# Patient Record
Sex: MALE | Race: WHITE | NOT HISPANIC OR LATINO | Employment: FULL TIME | URBAN - METROPOLITAN AREA
[De-identification: names, ages, dates, MRNs, and addresses within clinical notes are randomized per-mention and may not be internally consistent; named-entity substitution may affect disease eponyms.]

---

## 2023-02-24 PROBLEM — R73.9 ELEVATED BLOOD SUGAR: Status: ACTIVE | Noted: 2023-02-24

## 2023-02-24 PROBLEM — R00.2 PALPITATIONS: Status: ACTIVE | Noted: 2023-02-24

## 2023-02-24 PROBLEM — N50.89 TESTICLE LUMP: Status: ACTIVE | Noted: 2023-02-24

## 2023-02-24 PROBLEM — R06.83 SNORING: Status: ACTIVE | Noted: 2023-02-24

## 2023-02-24 PROBLEM — K21.9 ACID REFLUX: Status: ACTIVE | Noted: 2023-02-24

## 2023-02-24 PROBLEM — H57.89 IRRITATION OF LEFT EYE: Status: ACTIVE | Noted: 2023-02-24

## 2023-02-24 PROBLEM — M54.50 LEFT LOW BACK PAIN: Status: ACTIVE | Noted: 2023-02-24

## 2023-02-24 PROBLEM — E66.9 CLASS 1 OBESITY WITH BODY MASS INDEX (BMI) OF 31.0 TO 31.9 IN ADULT: Status: ACTIVE | Noted: 2023-02-24

## 2023-02-24 PROBLEM — D64.9 ANEMIA: Status: ACTIVE | Noted: 2023-02-24

## 2023-02-24 PROBLEM — E66.811 CLASS 1 OBESITY WITH BODY MASS INDEX (BMI) OF 31.0 TO 31.9 IN ADULT: Status: ACTIVE | Noted: 2023-02-24

## 2023-02-24 PROBLEM — E03.9 HYPOTHYROIDISM: Status: ACTIVE | Noted: 2023-02-24

## 2023-02-24 PROBLEM — N43.3 HYDROCELE: Status: ACTIVE | Noted: 2023-02-24

## 2023-02-24 RX ORDER — LEVOTHYROXINE SODIUM 88 UG/1
88 TABLET ORAL DAILY
COMMUNITY
End: 2023-05-22 | Stop reason: SDUPTHER

## 2023-03-16 ENCOUNTER — APPOINTMENT (OUTPATIENT)
Dept: PRIMARY CARE | Facility: CLINIC | Age: 53
End: 2023-03-16
Payer: COMMERCIAL

## 2023-05-15 ENCOUNTER — TELEPHONE (OUTPATIENT)
Dept: PRIMARY CARE | Facility: CLINIC | Age: 53
End: 2023-05-15
Payer: COMMERCIAL

## 2023-05-15 DIAGNOSIS — Z00.00 ROUTINE GENERAL MEDICAL EXAMINATION AT A HEALTH CARE FACILITY: Primary | ICD-10-CM

## 2023-05-15 DIAGNOSIS — E03.9 HYPOTHYROIDISM, UNSPECIFIED TYPE: ICD-10-CM

## 2023-05-15 DIAGNOSIS — R73.9 ELEVATED BLOOD SUGAR: ICD-10-CM

## 2023-05-20 LAB
THYROTROPIN (MIU/L) IN SER/PLAS BY DETECTION LIMIT <= 0.05 MIU/L: 5.32 MIU/L (ref 0.44–3.98)
THYROXINE (T4) FREE (NG/DL) IN SER/PLAS: 1.13 NG/DL (ref 0.78–1.48)

## 2023-05-22 ENCOUNTER — OFFICE VISIT (OUTPATIENT)
Dept: PRIMARY CARE | Facility: CLINIC | Age: 53
End: 2023-05-22
Payer: COMMERCIAL

## 2023-05-22 VITALS
SYSTOLIC BLOOD PRESSURE: 140 MMHG | BODY MASS INDEX: 31.49 KG/M2 | WEIGHT: 225.8 LBS | DIASTOLIC BLOOD PRESSURE: 90 MMHG | TEMPERATURE: 98.1 F

## 2023-05-22 DIAGNOSIS — E66.9 CLASS 1 OBESITY WITHOUT SERIOUS COMORBIDITY WITH BODY MASS INDEX (BMI) OF 31.0 TO 31.9 IN ADULT, UNSPECIFIED OBESITY TYPE: ICD-10-CM

## 2023-05-22 DIAGNOSIS — R03.0 ELEVATED BLOOD PRESSURE READING: ICD-10-CM

## 2023-05-22 DIAGNOSIS — E03.9 HYPOTHYROIDISM, UNSPECIFIED TYPE: Primary | ICD-10-CM

## 2023-05-22 PROCEDURE — 99214 OFFICE O/P EST MOD 30 MIN: CPT | Performed by: FAMILY MEDICINE

## 2023-05-22 PROCEDURE — 1036F TOBACCO NON-USER: CPT | Performed by: FAMILY MEDICINE

## 2023-05-22 RX ORDER — LEVOTHYROXINE SODIUM 100 UG/1
100 TABLET ORAL DAILY
Qty: 90 TABLET | Refills: 0 | Status: SHIPPED | OUTPATIENT
Start: 2023-05-22 | End: 2023-08-02 | Stop reason: SDUPTHER

## 2023-05-22 RX ORDER — METHYLPREDNISOLONE 4 MG/1
TABLET ORAL
COMMUNITY
Start: 2023-05-19 | End: 2024-02-02 | Stop reason: ALTCHOICE

## 2023-05-22 RX ORDER — METHOCARBAMOL 750 MG/1
TABLET, FILM COATED ORAL
COMMUNITY

## 2023-05-22 RX ORDER — HYDROCODONE BITARTRATE AND ACETAMINOPHEN 10; 325 MG/1; MG/1
TABLET ORAL
COMMUNITY
Start: 2023-05-02

## 2023-05-22 RX ORDER — IBUPROFEN 800 MG/1
TABLET ORAL
COMMUNITY
End: 2024-02-02 | Stop reason: ALTCHOICE

## 2023-05-22 ASSESSMENT — ENCOUNTER SYMPTOMS
EYE PAIN: 0
BACK PAIN: 1
APPETITE CHANGE: 0
SLEEP DISTURBANCE: 0
ADENOPATHY: 0
NAUSEA: 0
ARTHRALGIAS: 1
DYSPHORIC MOOD: 0
NERVOUS/ANXIOUS: 0
WEAKNESS: 0
BRUISES/BLEEDS EASILY: 0
DIARRHEA: 0
DIFFICULTY URINATING: 0
COUGH: 0
MYALGIAS: 0
SINUS PAIN: 0
ABDOMINAL PAIN: 0
BLOOD IN STOOL: 0
CONSTIPATION: 0
ACTIVITY CHANGE: 0
SHORTNESS OF BREATH: 0
PALPITATIONS: 0
VOMITING: 0
HEADACHES: 0
FEVER: 0

## 2023-05-22 ASSESSMENT — PATIENT HEALTH QUESTIONNAIRE - PHQ9
SUM OF ALL RESPONSES TO PHQ9 QUESTIONS 1 AND 2: 0
1. LITTLE INTEREST OR PLEASURE IN DOING THINGS: NOT AT ALL
2. FEELING DOWN, DEPRESSED OR HOPELESS: NOT AT ALL

## 2023-05-22 NOTE — PROGRESS NOTES
Subjective   Patient ID: Dillon Manzano Jr. is a 52 y.o. male who presents for Med Refill.    Med Refill  Associated symptoms include arthralgias. Pertinent negatives include no abdominal pain, chest pain, congestion, coughing, fever, headaches, myalgias, nausea, rash, vomiting or weakness.      Here for medication follow-up  Increased levothyroixine to 88 mcg 3 months ago .  States he is taking every day as directed without any issues  He notes his blood pressure has been running around 140/90 at his last few pain management appointments  He has been taking more ibuprofen and his activity has been down due to upcoming meniscus surgery on his knee next month  He is asymptomatic  He does not use tobacco  Rare alcohol  He is trying to eat healthier  His weight is up about 20 pounds in the last 2 years  He is otherwise feeling well    Review of Systems   Constitutional:  Negative for activity change, appetite change and fever.   HENT:  Negative for congestion, ear pain and sinus pain.    Eyes:  Negative for pain and visual disturbance.   Respiratory:  Negative for cough and shortness of breath.    Cardiovascular:  Negative for chest pain, palpitations and leg swelling.   Gastrointestinal:  Negative for abdominal pain, blood in stool, constipation, diarrhea, nausea and vomiting.   Endocrine: Negative for cold intolerance and heat intolerance.   Genitourinary:  Negative for difficulty urinating.   Musculoskeletal:  Positive for arthralgias, back pain and gait problem. Negative for myalgias.   Skin:  Negative for rash.   Neurological:  Negative for weakness and headaches.   Hematological:  Negative for adenopathy. Does not bruise/bleed easily.   Psychiatric/Behavioral:  Negative for dysphoric mood and sleep disturbance. The patient is not nervous/anxious.        Objective   /90   Temp 36.7 °C (98.1 °F)   Wt 102 kg (225 lb 12.8 oz)   BMI 31.49 kg/m²     Physical Exam  Vitals and nursing note reviewed.    Constitutional:       Appearance: Normal appearance.   HENT:      Head: Normocephalic and atraumatic.      Nose: Nose normal.      Mouth/Throat:      Mouth: Mucous membranes are moist.   Eyes:      Pupils: Pupils are equal, round, and reactive to light.   Cardiovascular:      Rate and Rhythm: Normal rate and regular rhythm.      Pulses: Normal pulses.      Heart sounds: Normal heart sounds.   Pulmonary:      Effort: Pulmonary effort is normal.      Breath sounds: Normal breath sounds.   Musculoskeletal:         General: No swelling. Normal range of motion.      Cervical back: Normal range of motion and neck supple.   Skin:     Capillary Refill: Capillary refill takes less than 2 seconds.   Neurological:      General: No focal deficit present.      Mental Status: He is alert. Mental status is at baseline.   Psychiatric:         Mood and Affect: Mood normal.         Behavior: Behavior normal.         Thought Content: Thought content normal.         Judgment: Judgment normal.         Assessment/Plan   1. Hypothyroidism, unspecified type  Reviewed recent labs.  Still undertreated.  Increase levothyroxine to 100 mcg daily and recheck labs in 3 months  - levothyroxine (Synthroid, Levoxyl) 100 mcg tablet; Take 1 tablet (100 mcg) by mouth once daily. As directed  Dispense: 90 tablet; Refill: 0    For his blood pressure elevation we will get other labs that were ordered.  Likely due to inactivity and increased ibuprofen use.  He will talk to pain management about this and is hopeful that after his meniscus surgery he will be able to be more active.  He will work on dietary changes as well  We will plan to follow-up in about 3 months unless concerns sooner    Sacha Nguyễn, DO

## 2023-07-21 ENCOUNTER — LAB (OUTPATIENT)
Dept: LAB | Facility: LAB | Age: 53
End: 2023-07-21
Payer: COMMERCIAL

## 2023-07-21 DIAGNOSIS — Z00.00 ROUTINE GENERAL MEDICAL EXAMINATION AT A HEALTH CARE FACILITY: ICD-10-CM

## 2023-07-21 DIAGNOSIS — E03.9 HYPOTHYROIDISM, UNSPECIFIED TYPE: ICD-10-CM

## 2023-07-21 DIAGNOSIS — R73.9 ELEVATED BLOOD SUGAR: ICD-10-CM

## 2023-07-21 LAB
ALANINE AMINOTRANSFERASE (SGPT) (U/L) IN SER/PLAS: 28 U/L (ref 10–52)
ALBUMIN (G/DL) IN SER/PLAS: 4.5 G/DL (ref 3.4–5)
ALKALINE PHOSPHATASE (U/L) IN SER/PLAS: 61 U/L (ref 33–120)
ANION GAP IN SER/PLAS: 11 MMOL/L (ref 10–20)
ASPARTATE AMINOTRANSFERASE (SGOT) (U/L) IN SER/PLAS: 17 U/L (ref 9–39)
BASOPHILS (10*3/UL) IN BLOOD BY AUTOMATED COUNT: 0.02 X10E9/L (ref 0–0.1)
BASOPHILS/100 LEUKOCYTES IN BLOOD BY AUTOMATED COUNT: 0.4 % (ref 0–2)
BILIRUBIN TOTAL (MG/DL) IN SER/PLAS: 0.6 MG/DL (ref 0–1.2)
CALCIUM (MG/DL) IN SER/PLAS: 9.8 MG/DL (ref 8.6–10.6)
CARBON DIOXIDE, TOTAL (MMOL/L) IN SER/PLAS: 29 MMOL/L (ref 21–32)
CHLORIDE (MMOL/L) IN SER/PLAS: 106 MMOL/L (ref 98–107)
CHOLESTEROL (MG/DL) IN SER/PLAS: 259 MG/DL (ref 0–199)
CHOLESTEROL IN HDL (MG/DL) IN SER/PLAS: 45.3 MG/DL
CHOLESTEROL/HDL RATIO: 5.7
CREATININE (MG/DL) IN SER/PLAS: 0.76 MG/DL (ref 0.5–1.3)
EOSINOPHILS (10*3/UL) IN BLOOD BY AUTOMATED COUNT: 0.33 X10E9/L (ref 0–0.7)
EOSINOPHILS/100 LEUKOCYTES IN BLOOD BY AUTOMATED COUNT: 6.6 % (ref 0–6)
ERYTHROCYTE DISTRIBUTION WIDTH (RATIO) BY AUTOMATED COUNT: 13 % (ref 11.5–14.5)
ERYTHROCYTE MEAN CORPUSCULAR HEMOGLOBIN CONCENTRATION (G/DL) BY AUTOMATED: 33.2 G/DL (ref 32–36)
ERYTHROCYTE MEAN CORPUSCULAR VOLUME (FL) BY AUTOMATED COUNT: 95 FL (ref 80–100)
ERYTHROCYTES (10*6/UL) IN BLOOD BY AUTOMATED COUNT: 4.3 X10E12/L (ref 4.5–5.9)
ESTIMATED AVERAGE GLUCOSE FOR HBA1C: 126 MG/DL
GFR MALE: >90 ML/MIN/1.73M2
GLUCOSE (MG/DL) IN SER/PLAS: 96 MG/DL (ref 74–99)
HEMATOCRIT (%) IN BLOOD BY AUTOMATED COUNT: 41 % (ref 41–52)
HEMOGLOBIN (G/DL) IN BLOOD: 13.6 G/DL (ref 13.5–17.5)
HEMOGLOBIN A1C/HEMOGLOBIN TOTAL IN BLOOD: 6 %
IMMATURE GRANULOCYTES/100 LEUKOCYTES IN BLOOD BY AUTOMATED COUNT: 0.2 % (ref 0–0.9)
LDL: 193 MG/DL (ref 0–99)
LEUKOCYTES (10*3/UL) IN BLOOD BY AUTOMATED COUNT: 5 X10E9/L (ref 4.4–11.3)
LYMPHOCYTES (10*3/UL) IN BLOOD BY AUTOMATED COUNT: 1.52 X10E9/L (ref 1.2–4.8)
LYMPHOCYTES/100 LEUKOCYTES IN BLOOD BY AUTOMATED COUNT: 30.3 % (ref 13–44)
MONOCYTES (10*3/UL) IN BLOOD BY AUTOMATED COUNT: 0.48 X10E9/L (ref 0.1–1)
MONOCYTES/100 LEUKOCYTES IN BLOOD BY AUTOMATED COUNT: 9.6 % (ref 2–10)
NEUTROPHILS (10*3/UL) IN BLOOD BY AUTOMATED COUNT: 2.65 X10E9/L (ref 1.2–7.7)
NEUTROPHILS/100 LEUKOCYTES IN BLOOD BY AUTOMATED COUNT: 52.9 % (ref 40–80)
NRBC (PER 100 WBCS) BY AUTOMATED COUNT: 0 /100 WBC (ref 0–0)
PLATELETS (10*3/UL) IN BLOOD AUTOMATED COUNT: 239 X10E9/L (ref 150–450)
POTASSIUM (MMOL/L) IN SER/PLAS: 4.6 MMOL/L (ref 3.5–5.3)
PROSTATE SPECIFIC ANTIGEN,SCREEN: 1.22 NG/ML (ref 0–4)
PROTEIN TOTAL: 7.1 G/DL (ref 6.4–8.2)
SODIUM (MMOL/L) IN SER/PLAS: 141 MMOL/L (ref 136–145)
THYROTROPIN (MIU/L) IN SER/PLAS BY DETECTION LIMIT <= 0.05 MIU/L: 2.97 MIU/L (ref 0.44–3.98)
TRIGLYCERIDE (MG/DL) IN SER/PLAS: 103 MG/DL (ref 0–149)
UREA NITROGEN (MG/DL) IN SER/PLAS: 26 MG/DL (ref 6–23)
VLDL: 21 MG/DL (ref 0–40)

## 2023-07-21 PROCEDURE — 85025 COMPLETE CBC W/AUTO DIFF WBC: CPT

## 2023-07-21 PROCEDURE — 83036 HEMOGLOBIN GLYCOSYLATED A1C: CPT

## 2023-07-21 PROCEDURE — 84443 ASSAY THYROID STIM HORMONE: CPT

## 2023-07-21 PROCEDURE — 36415 COLL VENOUS BLD VENIPUNCTURE: CPT

## 2023-07-21 PROCEDURE — 84153 ASSAY OF PSA TOTAL: CPT

## 2023-07-21 PROCEDURE — 80053 COMPREHEN METABOLIC PANEL: CPT

## 2023-07-21 PROCEDURE — 80061 LIPID PANEL: CPT

## 2023-08-02 ENCOUNTER — OFFICE VISIT (OUTPATIENT)
Dept: PRIMARY CARE | Facility: CLINIC | Age: 53
End: 2023-08-02
Payer: COMMERCIAL

## 2023-08-02 VITALS
SYSTOLIC BLOOD PRESSURE: 136 MMHG | TEMPERATURE: 98.2 F | OXYGEN SATURATION: 97 % | DIASTOLIC BLOOD PRESSURE: 80 MMHG | WEIGHT: 226.2 LBS | HEART RATE: 67 BPM | BODY MASS INDEX: 31.55 KG/M2

## 2023-08-02 DIAGNOSIS — E03.9 HYPOTHYROIDISM, UNSPECIFIED TYPE: Primary | ICD-10-CM

## 2023-08-02 DIAGNOSIS — E78.2 MIXED HYPERLIPIDEMIA: ICD-10-CM

## 2023-08-02 DIAGNOSIS — R73.9 ELEVATED BLOOD SUGAR: ICD-10-CM

## 2023-08-02 PROCEDURE — 1036F TOBACCO NON-USER: CPT | Performed by: FAMILY MEDICINE

## 2023-08-02 PROCEDURE — 99214 OFFICE O/P EST MOD 30 MIN: CPT | Performed by: FAMILY MEDICINE

## 2023-08-02 RX ORDER — LEVOTHYROXINE SODIUM 100 UG/1
100 TABLET ORAL DAILY
Qty: 90 TABLET | Refills: 1 | Status: SHIPPED | OUTPATIENT
Start: 2023-08-02 | End: 2024-02-02 | Stop reason: SDUPTHER

## 2023-08-02 ASSESSMENT — PATIENT HEALTH QUESTIONNAIRE - PHQ9
1. LITTLE INTEREST OR PLEASURE IN DOING THINGS: NOT AT ALL
2. FEELING DOWN, DEPRESSED OR HOPELESS: NOT AT ALL
SUM OF ALL RESPONSES TO PHQ9 QUESTIONS 1 AND 2: 0

## 2023-08-02 ASSESSMENT — ENCOUNTER SYMPTOMS
SINUS PAIN: 0
CONSTIPATION: 0
ABDOMINAL PAIN: 0
PALPITATIONS: 0
HEADACHES: 0
FEVER: 0
ARTHRALGIAS: 0
EYE PAIN: 0
DIFFICULTY URINATING: 0
APPETITE CHANGE: 0
SLEEP DISTURBANCE: 0
ADENOPATHY: 0
BRUISES/BLEEDS EASILY: 0
ACTIVITY CHANGE: 0
NAUSEA: 0
MYALGIAS: 0
NERVOUS/ANXIOUS: 0
SHORTNESS OF BREATH: 0
VOMITING: 0
WEAKNESS: 0
DIARRHEA: 0
DYSPHORIC MOOD: 0
BLOOD IN STOOL: 0
COUGH: 0

## 2023-08-02 NOTE — PATIENT INSTRUCTIONS
Labs in 6 months   Work on [place] healthy diet   Get ct cardiac score done - For scheduling radiology appointments please call [phone].

## 2023-08-02 NOTE — PROGRESS NOTES
Subjective   Patient ID: Dillon Manzano Jr. is a 52 y.o. male who presents for Follow-up (Lab and bp check).    HPI   Here for medication follow-up  Increased levothyroixine to 100 mcg 3 months ago .  States he is taking every day as directed without any issues  Doing well posr meniscus surgery   He does not use tobacco  Rare alcohol  He is trying to eat healthier  His weight is up about 20 pounds in the last 2 years  He is otherwise feeling well    Review of Systems   Constitutional:  Negative for activity change, appetite change and fever.   HENT:  Negative for congestion, ear pain and sinus pain.    Eyes:  Negative for pain and visual disturbance.   Respiratory:  Negative for cough and shortness of breath.    Cardiovascular:  Negative for chest pain, palpitations and leg swelling.   Gastrointestinal:  Negative for abdominal pain, blood in stool, constipation, diarrhea, nausea and vomiting.   Endocrine: Negative for cold intolerance and heat intolerance.   Genitourinary:  Negative for difficulty urinating.   Musculoskeletal:  Negative for arthralgias, gait problem and myalgias.   Skin:  Negative for rash.   Neurological:  Negative for weakness and headaches.   Hematological:  Negative for adenopathy. Does not bruise/bleed easily.   Psychiatric/Behavioral:  Negative for dysphoric mood and sleep disturbance. The patient is not nervous/anxious.        Objective   /80   Pulse 67   Temp 36.8 °C (98.2 °F)   Wt 103 kg (226 lb 3.2 oz)   SpO2 97%   BMI 31.55 kg/m²     Physical Exam  Vitals and nursing note reviewed.   Constitutional:       Appearance: Normal appearance.   HENT:      Head: Normocephalic and atraumatic.      Nose: Nose normal.      Mouth/Throat:      Mouth: Mucous membranes are moist.   Eyes:      Pupils: Pupils are equal, round, and reactive to light.   Cardiovascular:      Rate and Rhythm: Normal rate and regular rhythm.      Pulses: Normal pulses.      Heart sounds: Normal heart sounds.  "  Pulmonary:      Effort: Pulmonary effort is normal.      Breath sounds: Normal breath sounds.   Musculoskeletal:         General: No swelling. Normal range of motion.      Cervical back: Normal range of motion and neck supple.      Right lower leg: No edema.      Left lower leg: No edema.   Skin:     Capillary Refill: Capillary refill takes less than 2 seconds.   Neurological:      General: No focal deficit present.      Mental Status: He is alert. Mental status is at baseline.   Psychiatric:         Mood and Affect: Mood normal.         Behavior: Behavior normal.         Thought Content: Thought content normal.         Judgment: Judgment normal.       Lab Results   Component Value Date    TSH 2.97 07/21/2023     Lab Results   Component Value Date    HGBA1C 6.0 (A) 07/21/2023     Lab Results   Component Value Date    CHOL 259 (H) 07/21/2023    CHOL 189 11/12/2021    CHOL 199 05/13/2019     Lab Results   Component Value Date    HDL 45.3 07/21/2023    HDL 38.2 (A) 11/12/2021    HDL 42.3 05/13/2019     No results found for: \"LDLCALC\"  Lab Results   Component Value Date    TRIG 103 07/21/2023    TRIG 127 11/12/2021    TRIG 95 05/13/2019     No components found for: \"CHOLHDL\"     Assessment/Plan   1. Hypothyroidism, unspecified type  Controlled. Continue current medicines/regimen.   Recheck labs 6 months  - levothyroxine (Synthroid, Levoxyl) 100 mcg tablet; Take 1 tablet (100 mcg) by mouth once daily. As directed  Dispense: 90 tablet; Refill: 1  - TSH with reflex to Free T4 if abnormal; Future    2. Elevated blood sugar  Stable labs q6 months. Diet info given  - Hemoglobin A1C; Future    3. Mixed hyperlipidemia  Sig worsened. Diet info given and ct ordered. Fu tbd   - Lipid Panel; Future  - CT cardiac scoring wo IV contrast; Future      Sacha Nguyễn, DO  "

## 2024-01-26 ENCOUNTER — LAB (OUTPATIENT)
Dept: LAB | Facility: LAB | Age: 54
End: 2024-01-26
Payer: COMMERCIAL

## 2024-01-26 DIAGNOSIS — E78.2 MIXED HYPERLIPIDEMIA: ICD-10-CM

## 2024-01-26 DIAGNOSIS — R73.9 ELEVATED BLOOD SUGAR: ICD-10-CM

## 2024-01-26 DIAGNOSIS — E03.9 HYPOTHYROIDISM, UNSPECIFIED TYPE: ICD-10-CM

## 2024-01-26 LAB
CHOLEST SERPL-MCNC: 217 MG/DL (ref 0–199)
CHOLESTEROL/HDL RATIO: 4.9
EST. AVERAGE GLUCOSE BLD GHB EST-MCNC: 123 MG/DL
HBA1C MFR BLD: 5.9 %
HDLC SERPL-MCNC: 44.4 MG/DL
LDLC SERPL CALC-MCNC: 146 MG/DL
NON HDL CHOLESTEROL: 173 MG/DL (ref 0–149)
T4 FREE SERPL-MCNC: 1.25 NG/DL (ref 0.78–1.48)
TRIGL SERPL-MCNC: 135 MG/DL (ref 0–149)
TSH SERPL-ACNC: 7.68 MIU/L (ref 0.44–3.98)
VLDL: 27 MG/DL (ref 0–40)

## 2024-01-26 PROCEDURE — 84443 ASSAY THYROID STIM HORMONE: CPT

## 2024-01-26 PROCEDURE — 84439 ASSAY OF FREE THYROXINE: CPT

## 2024-01-26 PROCEDURE — 36415 COLL VENOUS BLD VENIPUNCTURE: CPT

## 2024-01-26 PROCEDURE — 80061 LIPID PANEL: CPT

## 2024-01-26 PROCEDURE — 83036 HEMOGLOBIN GLYCOSYLATED A1C: CPT

## 2024-02-02 ENCOUNTER — OFFICE VISIT (OUTPATIENT)
Dept: PRIMARY CARE | Facility: CLINIC | Age: 54
End: 2024-02-02
Payer: COMMERCIAL

## 2024-02-02 VITALS
DIASTOLIC BLOOD PRESSURE: 86 MMHG | WEIGHT: 231.6 LBS | TEMPERATURE: 97.9 F | BODY MASS INDEX: 32.39 KG/M2 | SYSTOLIC BLOOD PRESSURE: 130 MMHG

## 2024-02-02 DIAGNOSIS — G89.29 CHRONIC BILATERAL LOW BACK PAIN WITHOUT SCIATICA: ICD-10-CM

## 2024-02-02 DIAGNOSIS — E78.2 MIXED HYPERLIPIDEMIA: ICD-10-CM

## 2024-02-02 DIAGNOSIS — E03.9 HYPOTHYROIDISM, UNSPECIFIED TYPE: ICD-10-CM

## 2024-02-02 DIAGNOSIS — E03.9 HYPOTHYROIDISM, UNSPECIFIED TYPE: Primary | ICD-10-CM

## 2024-02-02 DIAGNOSIS — R73.9 ELEVATED BLOOD SUGAR: ICD-10-CM

## 2024-02-02 DIAGNOSIS — M54.50 CHRONIC BILATERAL LOW BACK PAIN WITHOUT SCIATICA: ICD-10-CM

## 2024-02-02 PROCEDURE — 1036F TOBACCO NON-USER: CPT | Performed by: FAMILY MEDICINE

## 2024-02-02 PROCEDURE — 99214 OFFICE O/P EST MOD 30 MIN: CPT | Performed by: FAMILY MEDICINE

## 2024-02-02 RX ORDER — LEVOTHYROXINE SODIUM 100 UG/1
100 TABLET ORAL DAILY
Qty: 90 TABLET | Refills: 1 | OUTPATIENT
Start: 2024-02-02

## 2024-02-02 RX ORDER — LEVOTHYROXINE SODIUM 112 UG/1
112 TABLET ORAL DAILY
Qty: 90 TABLET | Refills: 0 | Status: SHIPPED | OUTPATIENT
Start: 2024-02-02 | End: 2024-04-26

## 2024-02-02 ASSESSMENT — ENCOUNTER SYMPTOMS
EYE PAIN: 0
NAUSEA: 0
VOMITING: 0
CONSTIPATION: 0
APPETITE CHANGE: 0
DIFFICULTY URINATING: 0
ACTIVITY CHANGE: 0
BLOOD IN STOOL: 0
ADENOPATHY: 0
HEADACHES: 0
MYALGIAS: 0
ARTHRALGIAS: 0
WEAKNESS: 0
DIARRHEA: 0
SHORTNESS OF BREATH: 0
FEVER: 0
BRUISES/BLEEDS EASILY: 0
DYSPHORIC MOOD: 0
PALPITATIONS: 0
SINUS PAIN: 0
SLEEP DISTURBANCE: 0
BACK PAIN: 1
NERVOUS/ANXIOUS: 0
COUGH: 0
ABDOMINAL PAIN: 0

## 2024-02-02 ASSESSMENT — PATIENT HEALTH QUESTIONNAIRE - PHQ9
SUM OF ALL RESPONSES TO PHQ9 QUESTIONS 1 AND 2: 0
2. FEELING DOWN, DEPRESSED OR HOPELESS: NOT AT ALL
1. LITTLE INTEREST OR PLEASURE IN DOING THINGS: NOT AT ALL

## 2024-02-02 NOTE — PATIENT INSTRUCTIONS
Increase levothyroxine to 112 mcg daily.  Recheck labs in 3 months unless you get into see endocrinology before then  We will get you set up for endocrinology, pain management and CT cardiac score  If the palpitations get worse let me know and we can order a Holter monitor and echocardiogram  For scheduling radiology appointments please call [phone].

## 2024-02-02 NOTE — PROGRESS NOTES
Subjective   Patient ID: Dillon Manzano Jr. is a 53 y.o. male who presents for Follow-up (Also discuss heart fluttering ).    HPI   Here for medication follow-up  Increased levothyroixine to 100 mcg 6 months ago .  States he is taking every day as directed without any issues  No supplements except fish oil in last few months  He does not use tobacco  Rare alcohol  He is trying to eat healthier    Few months ago he had a couple episodes where he felt like his heart was racing.  It occurred when he was hunting  He states he was able to calm it down with deep breathing over a minute or so  There is no associated shortness of breath or chest pain  It has not happened since then    He is wondering about switching pain management to someone in [place].  His current provider who has been working with him for the last 20 years does not take insurance and is quite costly  He has chronic low back pain from degenerative disc and herniated disks    Review of Systems   Constitutional:  Negative for activity change, appetite change and fever.   HENT:  Negative for congestion, ear pain and sinus pain.    Eyes:  Negative for pain and visual disturbance.   Respiratory:  Negative for cough and shortness of breath.    Cardiovascular:  Negative for chest pain, palpitations and leg swelling.   Gastrointestinal:  Negative for abdominal pain, blood in stool, constipation, diarrhea, nausea and vomiting.   Endocrine: Negative for cold intolerance and heat intolerance.   Genitourinary:  Negative for difficulty urinating.   Musculoskeletal:  Positive for back pain. Negative for arthralgias, gait problem and myalgias.   Skin:  Negative for rash.   Neurological:  Negative for weakness and headaches.   Hematological:  Negative for adenopathy. Does not bruise/bleed easily.   Psychiatric/Behavioral:  Negative for dysphoric mood and sleep disturbance. The patient is not nervous/anxious.        Objective   /86   Temp 36.6 °C (97.9 °F)   Wt  "105 kg (231 lb 9.6 oz)   BMI 32.39 kg/m²     Physical Exam  Vitals and nursing note reviewed.   Constitutional:       Appearance: Normal appearance.   HENT:      Head: Normocephalic and atraumatic.      Right Ear: External ear normal.      Left Ear: External ear normal.      Nose: Nose normal.      Mouth/Throat:      Mouth: Mucous membranes are moist.   Eyes:      Pupils: Pupils are equal, round, and reactive to light.   Cardiovascular:      Rate and Rhythm: Normal rate and regular rhythm.      Pulses: Normal pulses.      Heart sounds: Normal heart sounds. No murmur heard.     No friction rub. No gallop.   Pulmonary:      Effort: Pulmonary effort is normal.      Breath sounds: Normal breath sounds.   Musculoskeletal:         General: No swelling. Normal range of motion.      Cervical back: Normal range of motion and neck supple.      Right lower leg: No edema.      Left lower leg: No edema.   Lymphadenopathy:      Cervical: No cervical adenopathy.   Skin:     Capillary Refill: Capillary refill takes less than 2 seconds.   Neurological:      General: No focal deficit present.      Mental Status: He is alert. Mental status is at baseline.   Psychiatric:         Mood and Affect: Mood normal.         Behavior: Behavior normal.         Thought Content: Thought content normal.         Judgment: Judgment normal.       Lab Results   Component Value Date    TSH 7.68 (H) 01/26/2024     Lab Results   Component Value Date    HGBA1C 5.9 (H) 01/26/2024     Lab Results   Component Value Date    CHOL 217 (H) 01/26/2024    CHOL 259 (H) 07/21/2023    CHOL 189 11/12/2021     Lab Results   Component Value Date    HDL 44.4 01/26/2024    HDL 45.3 07/21/2023    HDL 38.2 (A) 11/12/2021     Lab Results   Component Value Date    LDLCALC 146 (H) 01/26/2024     Lab Results   Component Value Date    TRIG 135 01/26/2024    TRIG 103 07/21/2023    TRIG 127 11/12/2021     No components found for: \"CHOLHDL\"     Assessment/Plan   1. Hypothyroidism, " unspecified type  Uncontrolled.  Increase to levothyroxine 112 mcg and recheck labs in 3 months  We discussed that as he has had very difficult to control thyroid in the last year or so I would like him to get an endocrinology consult.  He agrees  - levothyroxine (Synthroid, Levoxyl) 112 mcg tablet; Take 1 tablet (112 mcg) by mouth once daily. As directed  Dispense: 90 tablet; Refill: 0  - Referral to Endocrinology; Future  - TSH with reflex to Free T4 if abnormal; Future    2. Chronic bilateral low back pain without sciatica  - Referral to Pain Medicine; Future    3. Elevated blood sugar + 4. Mixed hyperlipidemia  Improving.  Continue working on healthy lifestyle management    First palpitations they have resolved at this point  We discussed proceeding with Holter monitor echo or other.  He declines at this point but agrees to do CT cardiac score that was previously ordered and let me know if symptoms worsen        Sacha Nguyễn, DO

## 2024-03-04 ENCOUNTER — APPOINTMENT (OUTPATIENT)
Dept: PAIN MEDICINE | Facility: HOSPITAL | Age: 54
End: 2024-03-04
Payer: COMMERCIAL

## 2024-03-14 ENCOUNTER — OFFICE VISIT (OUTPATIENT)
Dept: PAIN MEDICINE | Facility: HOSPITAL | Age: 54
End: 2024-03-14
Payer: COMMERCIAL

## 2024-03-14 VITALS
SYSTOLIC BLOOD PRESSURE: 149 MMHG | BODY MASS INDEX: 32.37 KG/M2 | WEIGHT: 231.2 LBS | HEIGHT: 71 IN | DIASTOLIC BLOOD PRESSURE: 99 MMHG | OXYGEN SATURATION: 97 % | RESPIRATION RATE: 16 BRPM | HEART RATE: 66 BPM

## 2024-03-14 DIAGNOSIS — G89.29 CHRONIC BILATERAL LOW BACK PAIN WITHOUT SCIATICA: ICD-10-CM

## 2024-03-14 DIAGNOSIS — M54.50 CHRONIC BILATERAL LOW BACK PAIN WITHOUT SCIATICA: ICD-10-CM

## 2024-03-14 DIAGNOSIS — Z79.891 CHRONIC PRESCRIPTION OPIATE USE: Primary | ICD-10-CM

## 2024-03-14 PROCEDURE — 3008F BODY MASS INDEX DOCD: CPT | Performed by: PHYSICAL MEDICINE & REHABILITATION

## 2024-03-14 PROCEDURE — 99214 OFFICE O/P EST MOD 30 MIN: CPT | Performed by: PHYSICAL MEDICINE & REHABILITATION

## 2024-03-14 PROCEDURE — 99204 OFFICE O/P NEW MOD 45 MIN: CPT | Performed by: PHYSICAL MEDICINE & REHABILITATION

## 2024-03-14 PROCEDURE — 1036F TOBACCO NON-USER: CPT | Performed by: PHYSICAL MEDICINE & REHABILITATION

## 2024-03-14 ASSESSMENT — COLUMBIA-SUICIDE SEVERITY RATING SCALE - C-SSRS
6. HAVE YOU EVER DONE ANYTHING, STARTED TO DO ANYTHING, OR PREPARED TO DO ANYTHING TO END YOUR LIFE?: NO
1. IN THE PAST MONTH, HAVE YOU WISHED YOU WERE DEAD OR WISHED YOU COULD GO TO SLEEP AND NOT WAKE UP?: NO
2. HAVE YOU ACTUALLY HAD ANY THOUGHTS OF KILLING YOURSELF?: NO

## 2024-03-14 ASSESSMENT — ENCOUNTER SYMPTOMS
OCCASIONAL FEELINGS OF UNSTEADINESS: 0
LOSS OF SENSATION IN FEET: 0
DEPRESSION: 0

## 2024-03-14 NOTE — PROGRESS NOTES
Subjective   Patient ID: Dillon Manzano Jr. is a 53 y.o. male who presents for Back Pain.  HPI      Patient presents to office for initial eval of lower back pain that is caused by DDD and scoliosis. Patient states that the pain radiates up the back stopping before the shoulders. Denies numbness and tingling. Patient is currently in pain mgmt with [facility] and is switching practices due to a recent move.    Pain Score: 5/10    Treatment: Norco 10/325 QID from prior office    Injections/Procedures: denies, patient states he is uninterested in them at this time.    Neuromodulators/Other Medications:    Other: robaxin 750mg TID    OSWESTRY SCORE: 18%              He reports that he originally injured his back at work in 2001 when a coworker fell on him from some height.  He has never had back surgery.  He has done chiropractic and PT.  He also has a TENS machine that he uses frequently.  He also has used heat and ice.  He usually gets flares up of back pain 2-3 times per year the last 3 to 4 days that are caused by certain bending or twisting movements.    He feels his pain in his axial spine and does not radiate down his legs.  He feels the pain is made worse with bending lifting and twisting as well as truncal extension.  Sometimes the pain is made worse with coughing or sneezing.  He does not notice any numbness or tingling anywhere.    He has been taking Robaxin, meloxicam, ibuprofen, Norco.    He has been taking Norco  5 times a day.  He has been on opioids since 2001.  He denies side effects of constipation, dizziness, dry mouth, mental fogginess.    OARRS:  No data recorded  I have personally reviewed the OARRS report for Dillon Manzano Jr..    Is the patient prescribed a combination of a benzodiazepine and opioid?  No    Last Urine Drug Screen / ordered today: Yes n/a  No results found for this or any previous visit (from the past 8760 hour(s)).  Results are as expected.        Monitoring and compliance:    ORT: n/a    PDUQ: n/a    Office Agreement: 03/14/2024      Review of Systems     Current Outpatient Medications   Medication Instructions    HYDROcodone-acetaminophen (Norco)  mg tablet TAKE 1 TABLET BY MOUTH 1-5 TIMES DAILY AS NEEDED    levothyroxine (SYNTHROID, LEVOXYL) 112 mcg, oral, Daily, As directed    MELOXICAM ORAL 15 mg, oral, Daily    methocarbamol (Robaxin) 750 mg tablet TAKE 1 TABLET BY MOUTH 1 TO 3 TIMES DAILY        No past medical history on file.     Past Surgical History:   Procedure Laterality Date    OTHER SURGICAL HISTORY  05/01/2019    [place] tooth extraction    OTHER SURGICAL HISTORY  05/01/2019    Vasectomy    OTHER SURGICAL HISTORY  05/01/2019    Wrist surgery        Family History   Problem Relation Name Age of Onset    Diabetes Mother      Hypertension Mother      Lung cancer Mother      Lung cancer Father      Prostate cancer Father          No Known Allergies     Objective     Vitals:    03/14/24 1341   BP: (!) 149/99   Pulse: 66   Resp: 16   SpO2: 97%        Physical Exam    GENERAL EXAM  Vital Signs: Vital signs to include heart rate, respiration rate, blood pressure, and temperature were reviewed.  General Appearance:  Awake, alert, healthy appearing, well developed, No acute distress.  Head: Normocephalic without evidence of head injury.  Neck: The appearance of the neck was normal without swelling with a midline trachea.  Eyes: The eyelids and eyebrows exhibited no abnormalities.  Pupils were not pin-point.  Sclera was without icterus.  Lungs: Respiration rhythm and depth was normal.  Respiratory movements were normal without labored breathing.  Cardiovascular: No peripheral edema was present.    Neurological: Patient was oriented to time, place, and person.  Speech was normal.  Balance, gait, and stance were unremarkable.    Psychiatric: Appearance was normal with appropriate dress.  Mood was euthymic and affect was normal.  Skin: Affected  regions were without ecchymosis or skin lesions.      Straight leg raise does not reproduce pain bilaterally.  C[place]el[place] maneuver does not reproduce pain bilaterally.  He does have tenderness over the right sacroiliac joint more pronounced than the left.    Assessment/Plan   Problem List Items Addressed This Visit    None  Visit Diagnoses         Codes    Chronic prescription opiate use    -  Primary Z79.891    Chronic bilateral low back pain without sciatica     M54.50, G89.29                 53-year-old male with chronic back pain on long-term opioid therapy.  We discussed the risks and benefits of chronic opioid therapy for noncancer pain and explained the rationale for not prescribing these medications.  We discussed that our approach is to manage pain without opioids using nonopioid medications and interventions.  He is happy with his current opioid therapy and is not willing to de-escalate this therapy as he is afraid what may happen if nonopioid strategies do not work.  We discussed that we are more than happy to take care of him for other pains and he is more than welcome should he decide to come back and see us.    We discussed that chronic opioid use for non-cancer pain has the deleterious effects of respiratory suppression, endocrine dysfunction, osteoporosis, immunosuppression including increased cancer risk, sexual dysfunction, dependence, addiction, death, and paradoxically worsening pain.     If patient does choose to come back would like to restart with further workup to include an imaging to include x-rays as well as likely MRI given that he has had this pain for a long period of time as well as starting some other nonopioid pain medications to include medications such as gabapentin or duloxetine.  Differential includes discogenic lower back pain versus vertebral genic lower back pain versus mechanical lower back pain.  Could be coming to the age where facet pain may be coming more of an issue as  well.      This note was generated with the aid of dictation software, there may be typos despite my attempts at proofreading.     I saw and evaluated the patient. I personally obtained the key and critical portions of the history and physical exam or was physically present for key and critical portions performed by the resident/fellow. I reviewed the resident/fellow's documentation and discussed the patient with the resident/fellow. I agree with the resident/fellow's medical decision making as documented in the note.    Thomas Elena MD

## 2024-04-10 ENCOUNTER — HOSPITAL ENCOUNTER (OUTPATIENT)
Dept: RADIOLOGY | Facility: HOSPITAL | Age: 54
Discharge: HOME | End: 2024-04-10
Payer: COMMERCIAL

## 2024-04-10 DIAGNOSIS — R73.9 ELEVATED BLOOD SUGAR: ICD-10-CM

## 2024-04-10 DIAGNOSIS — E78.2 MIXED HYPERLIPIDEMIA: ICD-10-CM

## 2024-04-10 PROCEDURE — 75571 CT HRT W/O DYE W/CA TEST: CPT

## 2024-04-12 ENCOUNTER — TELEPHONE (OUTPATIENT)
Dept: PRIMARY CARE | Facility: CLINIC | Age: 54
End: 2024-04-12
Payer: COMMERCIAL

## 2024-04-12 NOTE — TELEPHONE ENCOUNTER
Per HIPAA ok to leave detailed message, LM on pt voicemail with results. He should call back if he has any questions or concerns.

## 2024-04-12 NOTE — TELEPHONE ENCOUNTER
----- Message from Sacha Nguyễn DO sent at 4/12/2024  2:25 PM EDT -----  Please call the patient and let know CT cardiac score overall not concerning.  We can discuss it further at his next appointment

## 2024-04-25 DIAGNOSIS — E03.9 HYPOTHYROIDISM, UNSPECIFIED TYPE: ICD-10-CM

## 2024-04-26 RX ORDER — LEVOTHYROXINE SODIUM 112 UG/1
112 TABLET ORAL DAILY
Qty: 90 TABLET | Refills: 0 | Status: SHIPPED | OUTPATIENT
Start: 2024-04-26 | End: 2024-05-31 | Stop reason: SDUPTHER

## 2024-05-31 ENCOUNTER — OFFICE VISIT (OUTPATIENT)
Dept: ENDOCRINOLOGY | Facility: CLINIC | Age: 54
End: 2024-05-31
Payer: COMMERCIAL

## 2024-05-31 ENCOUNTER — LAB (OUTPATIENT)
Dept: LAB | Facility: LAB | Age: 54
End: 2024-05-31
Payer: COMMERCIAL

## 2024-05-31 VITALS
SYSTOLIC BLOOD PRESSURE: 120 MMHG | BODY MASS INDEX: 32.34 KG/M2 | WEIGHT: 231 LBS | DIASTOLIC BLOOD PRESSURE: 70 MMHG | HEART RATE: 72 BPM | HEIGHT: 71 IN | RESPIRATION RATE: 16 BRPM

## 2024-05-31 DIAGNOSIS — E03.9 HYPOTHYROIDISM, UNSPECIFIED TYPE: ICD-10-CM

## 2024-05-31 LAB
T3FREE SERPL-MCNC: 4 PG/ML (ref 2.3–4.2)
T4 FREE SERPL-MCNC: 1.42 NG/DL (ref 0.78–1.48)
TSH SERPL-ACNC: 3.79 MIU/L (ref 0.44–3.98)

## 2024-05-31 PROCEDURE — 84439 ASSAY OF FREE THYROXINE: CPT

## 2024-05-31 PROCEDURE — 84443 ASSAY THYROID STIM HORMONE: CPT

## 2024-05-31 PROCEDURE — 36415 COLL VENOUS BLD VENIPUNCTURE: CPT

## 2024-05-31 PROCEDURE — 1036F TOBACCO NON-USER: CPT | Performed by: INTERNAL MEDICINE

## 2024-05-31 PROCEDURE — 99203 OFFICE O/P NEW LOW 30 MIN: CPT | Performed by: INTERNAL MEDICINE

## 2024-05-31 PROCEDURE — 84481 FREE ASSAY (FT-3): CPT

## 2024-05-31 RX ORDER — LEVOTHYROXINE SODIUM 125 UG/1
125 TABLET ORAL DAILY
Qty: 90 TABLET | Refills: 1 | Status: SHIPPED | OUTPATIENT
Start: 2024-05-31 | End: 2025-05-31

## 2024-05-31 ASSESSMENT — ENCOUNTER SYMPTOMS
SHORTNESS OF BREATH: 0
DIARRHEA: 0
NAUSEA: 0
FEVER: 0
LIGHT-HEADEDNESS: 0
VOMITING: 0
DIZZINESS: 0
CHILLS: 0

## 2024-05-31 NOTE — LETTER
May 31, 2024     Sacha Nguyễn DO  [address] [facility]  [address]  [facility] [address]    Patient: Dillon Manzano Jr.   YOB: 1970   Date of Visit: 5/31/2024       Dear Dr. Sacha Nguyễn DO:    Thank you for referring Dillon Manzano to me for evaluation. Below are my notes for this consultation.  If you have questions, please do not hesitate to call me. I look forward to following your patient along with you.       Sincerely,     Judy Gan MD      CC: No Recipients  ______________________________________________________________________________________    Endocrinology New Patient Consult  Subjective  Patient ID: Dillon Manzano Jr. is a 53 y.o. male who presents for Hypothyroidism. Patient was referred by Dr. Nguyễn.     PCP: Sacha Nguyễn DO    HPI  53-year referred by Dr Nguyễn for evaluation of hypothyroidism.  He has been on thyroid medication for the last several years at increasing doses.  Most recently his dose was increased to 112 mcg in January with a tsh of 7.6.   He has felt fine throughout.  His energy is good.  He sleeps ok.  He has been trying to work on weight due to predm.    Review of Systems   Constitutional:  Negative for chills and fever.   Respiratory:  Negative for shortness of breath.    Gastrointestinal:  Negative for diarrhea, nausea and vomiting.   Endocrine: Negative for cold intolerance and heat intolerance.   Neurological:  Negative for dizziness and light-headedness.       Patient Active Problem List   Diagnosis   • Acid reflux   • Anemia   • Elevated blood sugar   • Hydrocele   • Hypothyroidism   • Irritation of left eye   • Left low back pain   • Palpitations   • Snoring   • Testicle lump   • Class 1 obesity with body mass index (BMI) of 31.0 to 31.9 in adult   • Mixed hyperlipidemia       No past medical history on file.     Past Surgical History:   Procedure Laterality Date   • OTHER SURGICAL HISTORY  05/01/2019    [place] tooth extraction   • OTHER SURGICAL HISTORY   05/01/2019    Vasectomy   • OTHER SURGICAL HISTORY  05/01/2019    Wrist surgery        Social History     Tobacco Use   Smoking Status Never   Smokeless Tobacco Never      Social History     Substance and Sexual Activity   Alcohol Use None      Marital status:single  Employment:     Family History   Problem Relation Name Age of Onset   • Diabetes Mother     • Hypertension Mother     • Lung cancer Mother     • Lung cancer Father     • Prostate cancer Father          Home Meds:  Current Outpatient Medications   Medication Instructions   • HYDROcodone-acetaminophen (Norco)  mg tablet TAKE 1 TABLET BY MOUTH 1-5 TIMES DAILY AS NEEDED   • levothyroxine (SYNTHROID, LEVOXYL) 112 mcg, oral, Daily, as directed   • MELOXICAM ORAL 15 mg, oral, Daily   • methocarbamol (Robaxin) 750 mg tablet TAKE 1 TABLET BY MOUTH 1 TO 3 TIMES DAILY        No Known Allergies     Objective  Vitals:    05/31/24 1358   BP: 120/70   Pulse: 72   Resp: 16      Vitals:    05/31/24 1358   Weight: 105 kg (231 lb)      Body mass index is 32.22 kg/m².   Physical Exam  Constitutional:       Appearance: Normal appearance. He is overweight.   HENT:      Head: Normocephalic and atraumatic.   Neck:      Thyroid: No thyroid mass, thyromegaly or thyroid tenderness.   Cardiovascular:      Rate and Rhythm: Normal rate and regular rhythm.      Heart sounds: No murmur heard.     No gallop.   Pulmonary:      Effort: Pulmonary effort is normal.      Breath sounds: Normal breath sounds.   Abdominal:      Palpations: Abdomen is soft.      Comments: benign   Neurological:      General: No focal deficit present.      Mental Status: He is alert and oriented to person, place, and time.      Deep Tendon Reflexes: Reflexes are normal and symmetric.   Psychiatric:         Behavior: Behavior is cooperative.         Labs:  Lab Results   Component Value Date    HGBA1C 5.9 (H) 01/26/2024    TSH 7.68 (H) 01/26/2024    FREET4 1.25 01/26/2024      No  "results found for: \"PR1\", \"THYROIDPAB\", \"TSI\"     Assessment/Plan  Problem List Items Addressed This Visit       Hypothyroidism     53 yr old here for evaluation of hypothyroidism.  We discussed hypothyroidism in detail and adjustment over time.  We did discuss that progressive native thyroid damage can continue until reaching a total replacement dose.  He has been taking his thyroid medication appropriately so we will start adjusting his dose.  We will start with repeat blood work today and go from there.  I encouraged him to keep up his efforts with diet and exercise.  Electronically signed by:  Judy Gan MD 05/31/24  1:59 PM    "

## 2024-05-31 NOTE — PROGRESS NOTES
Endocrinology New Patient Consult  Subjective   Patient ID: Dillon Manzano Jr. is a 53 y.o. male who presents for Hypothyroidism. Patient was referred by Dr. Nguyễn.     PCP: Sacha Nguyễn DO    HPI  53-year referred by Dr Nguyễn for evaluation of hypothyroidism.  He has been on thyroid medication for the last several years at increasing doses.  Most recently his dose was increased to 112 mcg in January with a tsh of 7.6.   He has felt fine throughout.  His energy is good.  He sleeps ok.  He has been trying to work on weight due to predm.    Review of Systems   Constitutional:  Negative for chills and fever.   Respiratory:  Negative for shortness of breath.    Gastrointestinal:  Negative for diarrhea, nausea and vomiting.   Endocrine: Negative for cold intolerance and heat intolerance.   Neurological:  Negative for dizziness and light-headedness.       Patient Active Problem List   Diagnosis    Acid reflux    Anemia    Elevated blood sugar    Hydrocele    Hypothyroidism    Irritation of left eye    Left low back pain    Palpitations    Snoring    Testicle lump    Class 1 obesity with body mass index (BMI) of 31.0 to 31.9 in adult    Mixed hyperlipidemia       No past medical history on file.     Past Surgical History:   Procedure Laterality Date    OTHER SURGICAL HISTORY  05/01/2019    [place] tooth extraction    OTHER SURGICAL HISTORY  05/01/2019    Vasectomy    OTHER SURGICAL HISTORY  05/01/2019    Wrist surgery        Social History     Tobacco Use   Smoking Status Never   Smokeless Tobacco Never      Social History     Substance and Sexual Activity   Alcohol Use None      Marital status:single  Employment:     Family History   Problem Relation Name Age of Onset    Diabetes Mother      Hypertension Mother      Lung cancer Mother      Lung cancer Father      Prostate cancer Father          Home Meds:  Current Outpatient Medications   Medication Instructions    HYDROcodone-acetaminophen  "(Norco)  mg tablet TAKE 1 TABLET BY MOUTH 1-5 TIMES DAILY AS NEEDED    levothyroxine (SYNTHROID, LEVOXYL) 112 mcg, oral, Daily, as directed    MELOXICAM ORAL 15 mg, oral, Daily    methocarbamol (Robaxin) 750 mg tablet TAKE 1 TABLET BY MOUTH 1 TO 3 TIMES DAILY        No Known Allergies     Objective   Vitals:    05/31/24 1358   BP: 120/70   Pulse: 72   Resp: 16      Vitals:    05/31/24 1358   Weight: 105 kg (231 lb)      Body mass index is 32.22 kg/m².   Physical Exam  Constitutional:       Appearance: Normal appearance. He is overweight.   HENT:      Head: Normocephalic and atraumatic.   Neck:      Thyroid: No thyroid mass, thyromegaly or thyroid tenderness.   Cardiovascular:      Rate and Rhythm: Normal rate and regular rhythm.      Heart sounds: No murmur heard.     No gallop.   Pulmonary:      Effort: Pulmonary effort is normal.      Breath sounds: Normal breath sounds.   Abdominal:      Palpations: Abdomen is soft.      Comments: benign   Neurological:      General: No focal deficit present.      Mental Status: He is alert and oriented to person, place, and time.      Deep Tendon Reflexes: Reflexes are normal and symmetric.   Psychiatric:         Behavior: Behavior is cooperative.         Labs:  Lab Results   Component Value Date    HGBA1C 5.9 (H) 01/26/2024    TSH 7.68 (H) 01/26/2024    FREET4 1.25 01/26/2024      No results found for: \"PR1\", \"THYROIDPAB\", \"TSI\"     Assessment/Plan   Problem List Items Addressed This Visit       Hypothyroidism     53 yr old here for evaluation of hypothyroidism.  We discussed hypothyroidism in detail and adjustment over time.  We did discuss that progressive native thyroid damage can continue until reaching a total replacement dose.  He has been taking his thyroid medication appropriately so we will start adjusting his dose.  We will start with repeat blood work today and go from there.  I encouraged him to keep up his efforts with diet and exercise.  Electronically " signed by:  Judy Gna MD 05/31/24  1:59 PM

## 2024-05-31 NOTE — PATIENT INSTRUCTIONS
Thyroid blood work today  Adjustment based on levels  Follow-up in 6 months with blood work in between as needed  Please call or message with any concerns or questions

## 2024-06-05 ENCOUNTER — APPOINTMENT (OUTPATIENT)
Dept: PRIMARY CARE | Facility: CLINIC | Age: 54
End: 2024-06-05
Payer: COMMERCIAL

## 2024-09-06 ENCOUNTER — TELEPHONE (OUTPATIENT)
Dept: PRIMARY CARE | Facility: CLINIC | Age: 54
End: 2024-09-06
Payer: COMMERCIAL

## 2024-09-06 DIAGNOSIS — Z12.5 SCREENING FOR MALIGNANT NEOPLASM OF PROSTATE: ICD-10-CM

## 2024-09-06 DIAGNOSIS — R73.9 ELEVATED BLOOD SUGAR: ICD-10-CM

## 2024-09-06 DIAGNOSIS — E78.2 MIXED HYPERLIPIDEMIA: Primary | ICD-10-CM

## 2024-09-06 DIAGNOSIS — R03.0 ELEVATED BLOOD PRESSURE READING: ICD-10-CM

## 2024-09-10 NOTE — TELEPHONE ENCOUNTER
[place] per HIPAA to leave a detailed message.  Left voicemail relaying message from provider.

## 2024-09-23 ENCOUNTER — LAB (OUTPATIENT)
Dept: LAB | Facility: LAB | Age: 54
End: 2024-09-23
Payer: COMMERCIAL

## 2024-09-23 DIAGNOSIS — R03.0 ELEVATED BLOOD PRESSURE READING: ICD-10-CM

## 2024-09-23 DIAGNOSIS — Z12.5 SCREENING FOR MALIGNANT NEOPLASM OF PROSTATE: ICD-10-CM

## 2024-09-23 DIAGNOSIS — E78.2 MIXED HYPERLIPIDEMIA: ICD-10-CM

## 2024-09-23 DIAGNOSIS — R73.9 ELEVATED BLOOD SUGAR: ICD-10-CM

## 2024-09-23 DIAGNOSIS — E03.9 HYPOTHYROIDISM, UNSPECIFIED TYPE: ICD-10-CM

## 2024-09-23 LAB
ALBUMIN SERPL BCP-MCNC: 4 G/DL (ref 3.4–5)
ALP SERPL-CCNC: 51 U/L (ref 33–120)
ALT SERPL W P-5'-P-CCNC: 23 U/L (ref 10–52)
ANION GAP SERPL CALC-SCNC: 8 MMOL/L (ref 10–20)
AST SERPL W P-5'-P-CCNC: 16 U/L (ref 9–39)
BASOPHILS # BLD AUTO: 0.02 X10*3/UL (ref 0–0.1)
BASOPHILS NFR BLD AUTO: 0.4 %
BILIRUB SERPL-MCNC: 0.4 MG/DL (ref 0–1.2)
BUN SERPL-MCNC: 18 MG/DL (ref 6–23)
CALCIUM SERPL-MCNC: 8.8 MG/DL (ref 8.6–10.3)
CHLORIDE SERPL-SCNC: 105 MMOL/L (ref 98–107)
CHOLEST SERPL-MCNC: 209 MG/DL (ref 0–199)
CHOLESTEROL/HDL RATIO: 4.9
CO2 SERPL-SCNC: 31 MMOL/L (ref 21–32)
CREAT SERPL-MCNC: 0.86 MG/DL (ref 0.5–1.3)
EGFRCR SERPLBLD CKD-EPI 2021: >90 ML/MIN/1.73M*2
EOSINOPHIL # BLD AUTO: 0.36 X10*3/UL (ref 0–0.7)
EOSINOPHIL NFR BLD AUTO: 7.3 %
ERYTHROCYTE [DISTWIDTH] IN BLOOD BY AUTOMATED COUNT: 12.9 % (ref 11.5–14.5)
EST. AVERAGE GLUCOSE BLD GHB EST-MCNC: 128 MG/DL
GLUCOSE SERPL-MCNC: 111 MG/DL (ref 74–99)
HBA1C MFR BLD: 6.1 %
HCT VFR BLD AUTO: 40.1 % (ref 41–52)
HDLC SERPL-MCNC: 42.4 MG/DL
HGB BLD-MCNC: 13.1 G/DL (ref 13.5–17.5)
IMM GRANULOCYTES # BLD AUTO: 0.01 X10*3/UL (ref 0–0.7)
IMM GRANULOCYTES NFR BLD AUTO: 0.2 % (ref 0–0.9)
LDLC SERPL CALC-MCNC: 148 MG/DL
LYMPHOCYTES # BLD AUTO: 1.57 X10*3/UL (ref 1.2–4.8)
LYMPHOCYTES NFR BLD AUTO: 31.7 %
MCH RBC QN AUTO: 31.2 PG (ref 26–34)
MCHC RBC AUTO-ENTMCNC: 32.7 G/DL (ref 32–36)
MCV RBC AUTO: 96 FL (ref 80–100)
MONOCYTES # BLD AUTO: 0.46 X10*3/UL (ref 0.1–1)
MONOCYTES NFR BLD AUTO: 9.3 %
NEUTROPHILS # BLD AUTO: 2.54 X10*3/UL (ref 1.2–7.7)
NEUTROPHILS NFR BLD AUTO: 51.1 %
NON HDL CHOLESTEROL: 167 MG/DL (ref 0–149)
NRBC BLD-RTO: 0 /100 WBCS (ref 0–0)
PLATELET # BLD AUTO: 250 X10*3/UL (ref 150–450)
POTASSIUM SERPL-SCNC: 4.2 MMOL/L (ref 3.5–5.3)
PROT SERPL-MCNC: 6.2 G/DL (ref 6.4–8.2)
PSA SERPL-MCNC: 1.61 NG/ML
RBC # BLD AUTO: 4.2 X10*6/UL (ref 4.5–5.9)
SODIUM SERPL-SCNC: 140 MMOL/L (ref 136–145)
TRIGL SERPL-MCNC: 92 MG/DL (ref 0–149)
VLDL: 18 MG/DL (ref 0–40)
WBC # BLD AUTO: 5 X10*3/UL (ref 4.4–11.3)

## 2024-09-23 PROCEDURE — 83036 HEMOGLOBIN GLYCOSYLATED A1C: CPT

## 2024-09-23 PROCEDURE — 80061 LIPID PANEL: CPT

## 2024-09-23 PROCEDURE — 80053 COMPREHEN METABOLIC PANEL: CPT

## 2024-09-23 PROCEDURE — 84439 ASSAY OF FREE THYROXINE: CPT

## 2024-09-23 PROCEDURE — 85025 COMPLETE CBC W/AUTO DIFF WBC: CPT

## 2024-09-23 PROCEDURE — 84153 ASSAY OF PSA TOTAL: CPT

## 2024-09-23 PROCEDURE — 84443 ASSAY THYROID STIM HORMONE: CPT

## 2024-09-23 PROCEDURE — 36415 COLL VENOUS BLD VENIPUNCTURE: CPT

## 2024-09-25 ENCOUNTER — APPOINTMENT (OUTPATIENT)
Dept: PRIMARY CARE | Facility: CLINIC | Age: 54
End: 2024-09-25
Payer: COMMERCIAL

## 2024-09-25 VITALS
SYSTOLIC BLOOD PRESSURE: 130 MMHG | DIASTOLIC BLOOD PRESSURE: 80 MMHG | WEIGHT: 229.4 LBS | TEMPERATURE: 97.8 F | BODY MASS INDEX: 31.99 KG/M2

## 2024-09-25 DIAGNOSIS — E78.2 MIXED HYPERLIPIDEMIA: ICD-10-CM

## 2024-09-25 DIAGNOSIS — R73.9 ELEVATED BLOOD SUGAR: ICD-10-CM

## 2024-09-25 DIAGNOSIS — E03.9 HYPOTHYROIDISM, UNSPECIFIED TYPE: Primary | ICD-10-CM

## 2024-09-25 DIAGNOSIS — R97.20 INCREASED PROSTATE SPECIFIC ANTIGEN (PSA) VELOCITY: ICD-10-CM

## 2024-09-25 LAB
T4 FREE SERPL-MCNC: 1.17 NG/DL (ref 0.61–1.12)
TSH SERPL-ACNC: 4.59 MIU/L (ref 0.44–3.98)

## 2024-09-25 PROCEDURE — 1036F TOBACCO NON-USER: CPT | Performed by: FAMILY MEDICINE

## 2024-09-25 PROCEDURE — 99214 OFFICE O/P EST MOD 30 MIN: CPT | Performed by: FAMILY MEDICINE

## 2024-09-25 ASSESSMENT — ENCOUNTER SYMPTOMS
BRUISES/BLEEDS EASILY: 0
COUGH: 0
ADENOPATHY: 0
PALPITATIONS: 0
BACK PAIN: 1
MYALGIAS: 0
DYSPHORIC MOOD: 0
HEADACHES: 0
DIFFICULTY URINATING: 0
APPETITE CHANGE: 0
NAUSEA: 0
ABDOMINAL PAIN: 0
ARTHRALGIAS: 0
SINUS PAIN: 0
VOMITING: 0
ACTIVITY CHANGE: 0
WEAKNESS: 0
DIARRHEA: 0
EYE PAIN: 0
SHORTNESS OF BREATH: 0
SLEEP DISTURBANCE: 0
BLOOD IN STOOL: 0
CONSTIPATION: 0
NERVOUS/ANXIOUS: 0
FEVER: 0

## 2024-09-25 ASSESSMENT — PATIENT HEALTH QUESTIONNAIRE - PHQ9
2. FEELING DOWN, DEPRESSED OR HOPELESS: NOT AT ALL
SUM OF ALL RESPONSES TO PHQ9 QUESTIONS 1 AND 2: 0
1. LITTLE INTEREST OR PLEASURE IN DOING THINGS: NOT AT ALL

## 2024-09-25 NOTE — PROGRESS NOTES
Subjective   Patient ID: Dillon Manzano Jr. is a 53 y.o. male who presents for Follow-up.    HPI   Here for medication follow-up    Hypothyroidism-taking levothyroxine as directed  Seen by endocrinology 5/24   States he is taking every day as directed without any issues  No supplements except fish oil in last few months  He does not use tobacco  Rare alcohol  He is trying to eat healthier    Hyperlipidemia  Taking fish oil as directed  Cholesterol continues to lower as below  CT cardiac score done 4/24 that showed a score of 5    Prediabetes-continues to try to work on healthy diet and exercise  He is wondering about getting his testosterone checked as he feels that sometimes this could be a cause of need for improved wellness      Review of Systems   Constitutional:  Negative for activity change, appetite change and fever.   HENT:  Negative for congestion, ear pain and sinus pain.    Eyes:  Negative for pain and visual disturbance.   Respiratory:  Negative for cough and shortness of breath.    Cardiovascular:  Negative for chest pain, palpitations and leg swelling.   Gastrointestinal:  Negative for abdominal pain, blood in stool, constipation, diarrhea, nausea and vomiting.   Endocrine: Negative for cold intolerance and heat intolerance.   Genitourinary:  Negative for difficulty urinating.   Musculoskeletal:  Positive for back pain. Negative for arthralgias, gait problem and myalgias.   Skin:  Negative for rash.   Neurological:  Negative for weakness and headaches.   Hematological:  Negative for adenopathy. Does not bruise/bleed easily.   Psychiatric/Behavioral:  Negative for dysphoric mood and sleep disturbance. The patient is not nervous/anxious.        Objective   /80   Temp 36.6 °C (97.8 °F)   Wt 104 kg (229 lb 6.4 oz)   BMI 31.99 kg/m²     Physical Exam  Vitals and nursing note reviewed.   Constitutional:       Appearance: Normal appearance.   HENT:      Head: Normocephalic and atraumatic.       "Right Ear: External ear normal.      Left Ear: External ear normal.      Nose: Nose normal.      Mouth/Throat:      Mouth: Mucous membranes are moist.   Eyes:      Pupils: Pupils are equal, round, and reactive to light.   Cardiovascular:      Rate and Rhythm: Normal rate and regular rhythm.      Pulses: Normal pulses.      Heart sounds: Normal heart sounds. No murmur heard.     No friction rub. No gallop.   Pulmonary:      Effort: Pulmonary effort is normal.      Breath sounds: Normal breath sounds.   Musculoskeletal:         General: No swelling. Normal range of motion.      Cervical back: Normal range of motion and neck supple.      Right lower leg: No edema.      Left lower leg: No edema.   Lymphadenopathy:      Cervical: No cervical adenopathy.   Skin:     Capillary Refill: Capillary refill takes less than 2 seconds.   Neurological:      General: No focal deficit present.      Mental Status: He is alert. Mental status is at baseline.   Psychiatric:         Mood and Affect: Mood normal.         Behavior: Behavior normal.         Thought Content: Thought content normal.         Judgment: Judgment normal.     Lab Results   Component Value Date    TSH 3.79 05/31/2024     Lab Results   Component Value Date    HGBA1C 6.1 (H) 09/23/2024     Lab Results   Component Value Date    CHOL 209 (H) 09/23/2024    CHOL 217 (H) 01/26/2024    CHOL 259 (H) 07/21/2023     Lab Results   Component Value Date    HDL 42.4 09/23/2024    HDL 44.4 01/26/2024    HDL 45.3 07/21/2023     Lab Results   Component Value Date    LDLCALC 148 (H) 09/23/2024    LDLCALC 146 (H) 01/26/2024     Lab Results   Component Value Date    TRIG 92 09/23/2024    TRIG 135 01/26/2024    TRIG 103 07/21/2023     No components found for: \"CHOLHDL\"     Assessment/Plan   1. Hypothyroidism, unspecified type  Will add on TSH to recent labs drawn.  Follow-up to be determined  Has upcoming appointment with endocrinology    2.  Increased PSA velocity and family history of " prostate cancer  Recheck 6 months    3. Elevated blood sugar + 4. Mixed hyperlipidemia  Improving/stable.  Continue working on healthy lifestyle management  Offered nutrition referral which she declines  Plan for recheck in follow-up in 6 months unless concerns sooner          Sacha Nguyễn, DO

## 2024-09-27 ENCOUNTER — OFFICE VISIT (OUTPATIENT)
Dept: ENDOCRINOLOGY | Facility: CLINIC | Age: 54
End: 2024-09-27
Payer: COMMERCIAL

## 2024-09-27 VITALS
BODY MASS INDEX: 32.28 KG/M2 | HEIGHT: 71 IN | DIASTOLIC BLOOD PRESSURE: 70 MMHG | SYSTOLIC BLOOD PRESSURE: 112 MMHG | HEART RATE: 76 BPM | RESPIRATION RATE: 16 BRPM | WEIGHT: 230.6 LBS

## 2024-09-27 DIAGNOSIS — E66.09 CLASS 1 OBESITY DUE TO EXCESS CALORIES WITHOUT SERIOUS COMORBIDITY WITH BODY MASS INDEX (BMI) OF 32.0 TO 32.9 IN ADULT: ICD-10-CM

## 2024-09-27 DIAGNOSIS — E03.9 HYPOTHYROIDISM, UNSPECIFIED TYPE: Primary | ICD-10-CM

## 2024-09-27 DIAGNOSIS — R73.03 PRE-DIABETES: ICD-10-CM

## 2024-09-27 PROCEDURE — 99214 OFFICE O/P EST MOD 30 MIN: CPT | Performed by: INTERNAL MEDICINE

## 2024-09-27 PROCEDURE — 1036F TOBACCO NON-USER: CPT | Performed by: INTERNAL MEDICINE

## 2024-09-27 PROCEDURE — 3008F BODY MASS INDEX DOCD: CPT | Performed by: INTERNAL MEDICINE

## 2024-09-27 RX ORDER — LEVOTHYROXINE SODIUM 137 UG/1
137 TABLET ORAL DAILY
Qty: 90 TABLET | Refills: 1 | Status: SHIPPED | OUTPATIENT
Start: 2024-09-27 | End: 2025-09-27

## 2024-09-27 RX ORDER — SEMAGLUTIDE 0.25 MG/.5ML
0.25 INJECTION, SOLUTION SUBCUTANEOUS
Qty: 2 ML | Refills: 0 | Status: SHIPPED | OUTPATIENT
Start: 2024-09-27

## 2024-09-27 ASSESSMENT — ENCOUNTER SYMPTOMS
DIARRHEA: 0
VOMITING: 0
SHORTNESS OF BREATH: 0
FEVER: 0
NAUSEA: 0
LIGHT-HEADEDNESS: 0
CHILLS: 0
DIZZINESS: 0

## 2024-09-27 NOTE — PATIENT INSTRUCTIONS
Wegovy and your new thyroid dose will be called in   Please let us know if wegovy is not covered  Follow up in 4 months

## 2024-09-27 NOTE — PROGRESS NOTES
Endocrinology: Follow up visit  Subjective   Patient ID: Dillon Manzano Jr. is a 53 y.o. male who presents for Hypothyroidism.    PCP: Sacha Nguyễn, DO    HPI  Here for follow up hypothyroidism.   Taking t4 as directed.   Recent tsh off a bit , previously ok. Taking t4 as directed.  Feels ok except for weight.  Struggling to lose.  Wants to discuss weight loss meds, has pre dm.  No hx pancreatitis    Review of Systems   Constitutional:  Negative for chills and fever.   Respiratory:  Negative for shortness of breath.    Gastrointestinal:  Negative for diarrhea, nausea and vomiting.   Endocrine: Negative for cold intolerance and heat intolerance.   Neurological:  Negative for dizziness and light-headedness.       Patient Active Problem List   Diagnosis    Acid reflux    Anemia    Elevated blood sugar    Hydrocele    Hypothyroidism    Irritation of left eye    Left low back pain    Palpitations    Snoring    Testicle lump    Class 1 obesity with body mass index (BMI) of 31.0 to 31.9 in adult    Mixed hyperlipidemia        Home Meds:  Current Outpatient Medications   Medication Instructions    HYDROcodone-acetaminophen (Norco)  mg tablet TAKE 1 TABLET BY MOUTH 1-5 TIMES DAILY AS NEEDED    levothyroxine (SYNTHROID, LEVOXYL) 125 mcg, oral, Daily, as directed    MELOXICAM ORAL 15 mg, oral, Daily    methocarbamol (Robaxin) 750 mg tablet TAKE 1 TABLET BY MOUTH 1 TO 3 TIMES DAILY        No Known Allergies     Objective   Vitals:    09/27/24 1442   BP: 112/70   Pulse: 76   Resp: 16      Vitals:    09/27/24 1442   Weight: 105 kg (230 lb 9.6 oz)      Body mass index is 32.16 kg/m².   Physical Exam  Constitutional:       Appearance: Normal appearance. He is overweight.   HENT:      Head: Normocephalic and atraumatic.   Neck:      Thyroid: No thyroid mass, thyromegaly or thyroid tenderness.   Cardiovascular:      Rate and Rhythm: Normal rate and regular rhythm.      Heart sounds: No murmur heard.     No gallop.  "  Pulmonary:      Effort: Pulmonary effort is normal.      Breath sounds: Normal breath sounds.   Abdominal:      Palpations: Abdomen is soft.      Comments: benign   Neurological:      General: No focal deficit present.      Mental Status: He is alert and oriented to person, place, and time.      Deep Tendon Reflexes: Reflexes are normal and symmetric.   Psychiatric:         Behavior: Behavior is cooperative.         Labs:  Lab Results   Component Value Date    HGBA1C 6.1 (H) 09/23/2024    TSH 4.59 (H) 09/23/2024    FREET4 1.17 (H) 09/23/2024      No results found for: \"PR1\", \"THYROIDPAB\", \"TSI\"     Assessment/Plan   Assessment & Plan  Hypothyroidism, unspecified type    Discussed labs: increase to 137 mcg  Pre-diabetes    Discussed if wegovy not covered could try metformin  Class 1 obesity due to excess calories without serious comorbidity with body mass index (BMI) of 32.0 to 32.9 in adult    Discussed wegovy: risks and benefits, side effects  He would like to proceed  Follow up in 6 months      Electronically signed by:  Judy Gan MD 09/27/24 3:00 PM              "

## 2024-10-11 ENCOUNTER — APPOINTMENT (OUTPATIENT)
Dept: ENDOCRINOLOGY | Facility: CLINIC | Age: 54
End: 2024-10-11
Payer: COMMERCIAL

## 2024-11-27 ENCOUNTER — HOSPITAL ENCOUNTER (EMERGENCY)
Facility: HOSPITAL | Age: 54
Discharge: HOME | End: 2024-11-27
Attending: EMERGENCY MEDICINE
Payer: COMMERCIAL

## 2024-11-27 ENCOUNTER — APPOINTMENT (OUTPATIENT)
Dept: RADIOLOGY | Facility: HOSPITAL | Age: 54
End: 2024-11-27
Payer: COMMERCIAL

## 2024-11-27 VITALS
OXYGEN SATURATION: 98 % | SYSTOLIC BLOOD PRESSURE: 151 MMHG | HEIGHT: 71 IN | HEART RATE: 83 BPM | DIASTOLIC BLOOD PRESSURE: 90 MMHG | TEMPERATURE: 98.9 F | BODY MASS INDEX: 32.2 KG/M2 | RESPIRATION RATE: 16 BRPM | WEIGHT: 230 LBS

## 2024-11-27 DIAGNOSIS — L03.116 CELLULITIS OF LEFT LOWER EXTREMITY: Primary | ICD-10-CM

## 2024-11-27 LAB
ANION GAP SERPL CALC-SCNC: 12 MMOL/L (ref 10–20)
BASOPHILS # BLD AUTO: 0.02 X10*3/UL (ref 0–0.1)
BASOPHILS NFR BLD AUTO: 0.1 %
BUN SERPL-MCNC: 22 MG/DL (ref 6–23)
CALCIUM SERPL-MCNC: 8.9 MG/DL (ref 8.6–10.3)
CHLORIDE SERPL-SCNC: 103 MMOL/L (ref 98–107)
CO2 SERPL-SCNC: 24 MMOL/L (ref 21–32)
CREAT SERPL-MCNC: 0.84 MG/DL (ref 0.5–1.3)
CRP SERPL-MCNC: 12.82 MG/DL
EGFRCR SERPLBLD CKD-EPI 2021: >90 ML/MIN/1.73M*2
EOSINOPHIL # BLD AUTO: 0 X10*3/UL (ref 0–0.7)
EOSINOPHIL NFR BLD AUTO: 0 %
ERYTHROCYTE [DISTWIDTH] IN BLOOD BY AUTOMATED COUNT: 12.9 % (ref 11.5–14.5)
ERYTHROCYTE [SEDIMENTATION RATE] IN BLOOD BY WESTERGREN METHOD: 35 MM/H (ref 0–20)
GLUCOSE SERPL-MCNC: 191 MG/DL (ref 74–99)
HCT VFR BLD AUTO: 39.4 % (ref 41–52)
HGB BLD-MCNC: 13.2 G/DL (ref 13.5–17.5)
IMM GRANULOCYTES # BLD AUTO: 0.13 X10*3/UL (ref 0–0.7)
IMM GRANULOCYTES NFR BLD AUTO: 0.7 % (ref 0–0.9)
LACTATE SERPL-SCNC: 1.1 MMOL/L (ref 0.4–2)
LACTATE SERPL-SCNC: 2.4 MMOL/L (ref 0.4–2)
LYMPHOCYTES # BLD AUTO: 0.88 X10*3/UL (ref 1.2–4.8)
LYMPHOCYTES NFR BLD AUTO: 4.8 %
MCH RBC QN AUTO: 31 PG (ref 26–34)
MCHC RBC AUTO-ENTMCNC: 33.5 G/DL (ref 32–36)
MCV RBC AUTO: 93 FL (ref 80–100)
MONOCYTES # BLD AUTO: 1.1 X10*3/UL (ref 0.1–1)
MONOCYTES NFR BLD AUTO: 6 %
NEUTROPHILS # BLD AUTO: 16.15 X10*3/UL (ref 1.2–7.7)
NEUTROPHILS NFR BLD AUTO: 88.4 %
NRBC BLD-RTO: 0 /100 WBCS (ref 0–0)
PLATELET # BLD AUTO: 209 X10*3/UL (ref 150–450)
POTASSIUM SERPL-SCNC: 4.1 MMOL/L (ref 3.5–5.3)
RBC # BLD AUTO: 4.26 X10*6/UL (ref 4.5–5.9)
SODIUM SERPL-SCNC: 135 MMOL/L (ref 136–145)
WBC # BLD AUTO: 18.3 X10*3/UL (ref 4.4–11.3)

## 2024-11-27 PROCEDURE — 96361 HYDRATE IV INFUSION ADD-ON: CPT

## 2024-11-27 PROCEDURE — 36415 COLL VENOUS BLD VENIPUNCTURE: CPT | Performed by: NURSE PRACTITIONER

## 2024-11-27 PROCEDURE — 96365 THER/PROPH/DIAG IV INF INIT: CPT

## 2024-11-27 PROCEDURE — 93971 EXTREMITY STUDY: CPT

## 2024-11-27 PROCEDURE — 85652 RBC SED RATE AUTOMATED: CPT | Performed by: NURSE PRACTITIONER

## 2024-11-27 PROCEDURE — 93971 EXTREMITY STUDY: CPT | Mod: FOREIGN READ | Performed by: RADIOLOGY

## 2024-11-27 PROCEDURE — 80048 BASIC METABOLIC PNL TOTAL CA: CPT | Performed by: NURSE PRACTITIONER

## 2024-11-27 PROCEDURE — 73630 X-RAY EXAM OF FOOT: CPT | Mod: LEFT SIDE | Performed by: RADIOLOGY

## 2024-11-27 PROCEDURE — 86140 C-REACTIVE PROTEIN: CPT | Performed by: NURSE PRACTITIONER

## 2024-11-27 PROCEDURE — 99284 EMERGENCY DEPT VISIT MOD MDM: CPT | Mod: 25

## 2024-11-27 PROCEDURE — 83605 ASSAY OF LACTIC ACID: CPT | Performed by: NURSE PRACTITIONER

## 2024-11-27 PROCEDURE — 2500000004 HC RX 250 GENERAL PHARMACY W/ HCPCS (ALT 636 FOR OP/ED): Mod: JZ | Performed by: NURSE PRACTITIONER

## 2024-11-27 PROCEDURE — 85025 COMPLETE CBC W/AUTO DIFF WBC: CPT | Performed by: NURSE PRACTITIONER

## 2024-11-27 PROCEDURE — 73630 X-RAY EXAM OF FOOT: CPT | Mod: LT

## 2024-11-27 RX ORDER — CEFAZOLIN SODIUM 2 G/100ML
2 INJECTION, SOLUTION INTRAVENOUS ONCE
Status: COMPLETED | OUTPATIENT
Start: 2024-11-27 | End: 2024-11-27

## 2024-11-27 RX ORDER — SULFAMETHOXAZOLE AND TRIMETHOPRIM 800; 160 MG/1; MG/1
2 TABLET ORAL 2 TIMES DAILY
Qty: 28 TABLET | Refills: 0 | Status: SHIPPED | OUTPATIENT
Start: 2024-11-27 | End: 2024-12-04

## 2024-11-27 RX ORDER — CEFUROXIME AXETIL 500 MG/1
500 TABLET ORAL 2 TIMES DAILY
Qty: 20 TABLET | Refills: 0 | Status: SHIPPED | OUTPATIENT
Start: 2024-11-27 | End: 2024-12-07

## 2024-11-27 ASSESSMENT — PAIN SCALES - GENERAL
PAINLEVEL_OUTOF10: 6
PAINLEVEL_OUTOF10: 6

## 2024-11-27 ASSESSMENT — PAIN DESCRIPTION - ORIENTATION: ORIENTATION: LEFT

## 2024-11-27 ASSESSMENT — PAIN DESCRIPTION - LOCATION: LOCATION: LEG

## 2024-11-27 ASSESSMENT — PAIN - FUNCTIONAL ASSESSMENT: PAIN_FUNCTIONAL_ASSESSMENT: 0-10

## 2024-11-27 NOTE — ED PROVIDER NOTES
Chief Complaint   Patient presents with   • Leg Pain     Monday patient started with a pain in the top of his left foot, the pain has started climbing up to his groin with swelling and redness. History of venous insufficiency.       HPI       54 year old male presents to the Emergency Department today complaining of a 2-3 day history of left foot pain that he describes as throbbing in nature, constant, radiates to his left calf, and varies in intensity. Notes that he has had intermittent chills during this time frame. Reports that he noticed redness and warmth to the area yesterday. Denies any associated fever, headache, neck pain, chest pain, shortness of breath, abdominal pain, nausea, vomiting, diarrhea, constipation, or urinary symptoms.       History provided by:  Patient             Patient History   No past medical history on file.  Past Surgical History:   Procedure Laterality Date   • OTHER SURGICAL HISTORY  05/01/2019    Protection tooth extraction   • OTHER SURGICAL HISTORY  05/01/2019    Vasectomy   • OTHER SURGICAL HISTORY  05/01/2019    Wrist surgery     Family History   Problem Relation Name Age of Onset   • Diabetes Mother     • Hypertension Mother     • Lung cancer Mother     • Lung cancer Father     • Prostate cancer Father       Social History     Tobacco Use   • Smoking status: Never   • Smokeless tobacco: Never   Substance Use Topics   • Alcohol use: Not on file   • Drug use: Not on file           Physical Exam  Constitutional:       Appearance: Normal appearance.   HENT:      Head: Normocephalic.      Right Ear: External ear normal.      Left Ear: External ear normal.      Nose: Nose normal.      Mouth/Throat:      Mouth: Mucous membranes are moist.      Pharynx: Oropharynx is clear. No oropharyngeal exudate or posterior oropharyngeal erythema.   Eyes:      Conjunctiva/sclera: Conjunctivae normal.      Pupils: Pupils are equal, round, and reactive to light.   Cardiovascular:      Rate and Rhythm:  Normal rate and regular rhythm.      Pulses:           Radial pulses are 3+ on the right side and 3+ on the left side.        Dorsalis pedis pulses are 3+ on the right side and 3+ on the left side.      Heart sounds: Normal heart sounds. No murmur heard.     No friction rub. No gallop.   Pulmonary:      Effort: Pulmonary effort is normal. No respiratory distress.      Breath sounds: Normal breath sounds. No wheezing, rhonchi or rales.   Abdominal:      General: Abdomen is flat. Bowel sounds are normal.      Palpations: Abdomen is soft.      Tenderness: There is no abdominal tenderness. There is no right CVA tenderness, left CVA tenderness, guarding or rebound. Negative signs include Rosas's sign and McBurney's sign.   Musculoskeletal:         General: No swelling or deformity.      Cervical back: Full passive range of motion without pain.      Right lower leg: No edema.      Left lower leg: No edema.   Lymphadenopathy:      Cervical: No cervical adenopathy.   Skin:     Capillary Refill: Capillary refill takes less than 2 seconds.      Coloration: Skin is not jaundiced.      Findings: No rash.      Comments: Diffuse redness to the dorsal surface of the left foot that extends to the anterior aspect of the lower leg. Full ROM. Left dorsalis pedis pulse is strong and regular. Capillary refill was within normal limits. Sensation is intact distally.    Neurological:      General: No focal deficit present.      Mental Status: He is alert and oriented to person, place, and time. Mental status is at baseline.      Gait: Gait is intact.   Psychiatric:         Mood and Affect: Mood normal.         Behavior: Behavior is cooperative.         Labs Reviewed   CBC WITH AUTO DIFFERENTIAL - Abnormal       Result Value    WBC 18.3 (*)     nRBC 0.0      RBC 4.26 (*)     Hemoglobin 13.2 (*)     Hematocrit 39.4 (*)     MCV 93      MCH 31.0      MCHC 33.5      RDW 12.9      Platelets 209      Neutrophils % 88.4      Immature Granulocytes  %, Automated 0.7      Lymphocytes % 4.8      Monocytes % 6.0      Eosinophils % 0.0      Basophils % 0.1      Neutrophils Absolute 16.15 (*)     Immature Granulocytes Absolute, Automated 0.13      Lymphocytes Absolute 0.88 (*)     Monocytes Absolute 1.10 (*)     Eosinophils Absolute 0.00      Basophils Absolute 0.02     BASIC METABOLIC PANEL - Abnormal    Glucose 191 (*)     Sodium 135 (*)     Potassium 4.1      Chloride 103      Bicarbonate 24      Anion Gap 12      Urea Nitrogen 22      Creatinine 0.84      eGFR >90      Calcium 8.9     LACTATE - Abnormal    Lactate 2.4 (*)     Narrative:     Venipuncture immediately after or during the administration of Metamizole may lead to falsely low results. Testing should be performed immediately prior to Metamizole dosing.   SEDIMENTATION RATE, AUTOMATED - Abnormal    Sedimentation Rate 35 (*)    C-REACTIVE PROTEIN - Abnormal    C-Reactive Protein 12.82 (*)    LACTATE - Normal    Lactate 1.1      Narrative:     Venipuncture immediately after or during the administration of Metamizole may lead to falsely low results. Testing should be performed immediately prior to Metamizole dosing.       Lower extremity venous duplex left   Final Result   No evidence for DVT within the left lower extremity.   Signed by Farhad Patrick MD      XR foot left 3+ views   Final Result   No acute osseous abnormality.   Signed by Louie Patel MD               ED Course & MDM   ED Course as of 11/27/24 1233   Wed Nov 27, 2024   1117 This patient was seen by the advanced practice provider.  I have personally performed a substantive portion of the encounter.  I have seen and examined the patient; agree with the workup, evaluation, MDM, management and diagnosis.  The care plan has been discussed.      I personally saw the patient and made/approved the management plan and take responsibility for the patient management.    History: Patient here with left lower extremity pain and redness  Exam: Redness  pain and tenderness to left lower extremity  MDM: I independently interpreted study(s) showing CBC shows leukocytosis of 18, slight anemia with hemoglobin of 13.2.  Metabolic panel shows slight hyponatremia 135.  Patient living C-reactive protein and lactic of 2.4.  X-ray is read as negative.  Ultrasound obtained, images personally reviewed.  Patient offered admission however wants to trial home with oral antibiotics.  I feel this is reasonable.  No MRSA history.    No indication for admission.  Discussed findings and diagnosis with the patient, follow-up and return to ED precautions given, patient voiced understanding, agrees with plan, questions answered, patient was discharged in stable condition.   [JH]      ED Course User Index  [JH] Thomas Low MD         Diagnoses as of 11/27/24 1233   Cellulitis of left lower extremity           Medical Decision Making  Patient was seen and evaluated by Dr. Low. Saline lock was established with labs drawn and results as above. Noted to have an elevated WBC count of 18.3 and inflammatory markers. Remainder of blood counts, electrolytes, and kidney function were unremarkable. Lactate cleared after he was given 1 Liter of normal saline over 1 hour. Left foot x-ray shows no acute osseous abnormality, including osteomyelitis. US of the lower extremity was negative for DVT. There are no signs of compartment syndrome, septic joint, bony abnormality, or DVT. Treated his cellulitis with Ancef 2 grams IVPB. We offerd admission, but he declined. Given prescriptions for Ceftin and Bactrim. Given strict return precautions. Return if worse in any way. Discharged in stable condition with computer instructions.    Diagnostic Impression:     1. Acute cellulitis of the left lower extremity    2. IV meds and fluids in ED     3. Prescription therapy            Your medication list        ASK your doctor about these medications        Instructions Last Dose Given Next Dose Due    HYDROcodone-acetaminophen  mg tablet  Commonly known as: Norco           levothyroxine 137 mcg tablet  Commonly known as: Synthroid, Levoxyl      Take 1 tablet (137 mcg) by mouth once daily. as directed       MELOXICAM ORAL           methocarbamol 750 mg tablet  Commonly known as: Robaxin           Wegovy 0.25 mg/0.5 mL pen injector  Generic drug: semaglutide (weight loss)      Inject 0.25 mg under the skin every 7 days.                  Procedure  Procedures     Cheikh Pritchard, MICHELE-CNP  11/27/24 7729

## 2024-12-11 ENCOUNTER — APPOINTMENT (OUTPATIENT)
Dept: PRIMARY CARE | Facility: CLINIC | Age: 54
End: 2024-12-11
Payer: COMMERCIAL

## 2024-12-11 VITALS
BODY MASS INDEX: 31.1 KG/M2 | HEART RATE: 64 BPM | OXYGEN SATURATION: 99 % | WEIGHT: 223 LBS | TEMPERATURE: 98.1 F | SYSTOLIC BLOOD PRESSURE: 134 MMHG | DIASTOLIC BLOOD PRESSURE: 80 MMHG

## 2024-12-11 DIAGNOSIS — L03.116 CELLULITIS OF LEFT LOWER LEG: ICD-10-CM

## 2024-12-11 DIAGNOSIS — M79.89 MASS OF SOFT TISSUE OF LEFT LOWER EXTREMITY: ICD-10-CM

## 2024-12-11 DIAGNOSIS — M79.89 LEFT LEG SWELLING: Primary | ICD-10-CM

## 2024-12-11 PROCEDURE — 99214 OFFICE O/P EST MOD 30 MIN: CPT | Performed by: FAMILY MEDICINE

## 2024-12-11 PROCEDURE — 1036F TOBACCO NON-USER: CPT | Performed by: FAMILY MEDICINE

## 2024-12-11 RX ORDER — HYDROCHLOROTHIAZIDE 12.5 MG/1
12.5 TABLET ORAL DAILY
Qty: 30 TABLET | Refills: 0 | Status: SHIPPED | OUTPATIENT
Start: 2024-12-11 | End: 2025-01-10

## 2024-12-11 RX ORDER — MELOXICAM 15 MG/1
TABLET ORAL
COMMUNITY
Start: 2024-12-10

## 2024-12-11 ASSESSMENT — ENCOUNTER SYMPTOMS
DIARRHEA: 0
BRUISES/BLEEDS EASILY: 0
CONSTIPATION: 0
SINUS PAIN: 0
ADENOPATHY: 0
ARTHRALGIAS: 0
DYSPHORIC MOOD: 0
MYALGIAS: 0
BLOOD IN STOOL: 0
ACTIVITY CHANGE: 0
NERVOUS/ANXIOUS: 0
NAUSEA: 0
FEVER: 0
APPETITE CHANGE: 0
HEADACHES: 0
SHORTNESS OF BREATH: 0
DIFFICULTY URINATING: 0
ABDOMINAL PAIN: 0
SLEEP DISTURBANCE: 0
WEAKNESS: 0
EYE PAIN: 0
COUGH: 0
PALPITATIONS: 0
VOMITING: 0

## 2024-12-11 NOTE — PROGRESS NOTES
Subjective   Patient ID: Dillon Manzano Jr. is a 54 y.o. male who presents for Hospital Follow-up ( [place] 11/27/24 for cellulitis LLE/Still swollen & painful).    HPI   Patient here today for ER follow-up  Seen in emergency room 11/27/2024 for pain and swelling of his left foot  He was found to have a cellulitis and was put on Ceftin and Bactrim.  At time of ER evaluation he was having chills and rigors as well and feeling overall quite poorly  He had an x-ray that showed no deep infection and ultrasound that showed no blood clots  He had a significant leukocytosis in the ER  He completed course of antibiotics  He said the redness and pain have significantly improved and the swelling is down as well.  He states he always has some swelling in his legs due to known chronic venous insufficiency and wears compression stockings all the time  He is not having any further systemic signs of illness  He did notice an area of increased swelling on the medial aspect of his left lower leg that he does not recall if was there prior    Review of Systems   Constitutional:  Negative for activity change, appetite change and fever.   HENT:  Negative for congestion, ear pain and sinus pain.    Eyes:  Negative for pain and visual disturbance.   Respiratory:  Negative for cough and shortness of breath.    Cardiovascular:  Positive for leg swelling. Negative for chest pain and palpitations.   Gastrointestinal:  Negative for abdominal pain, blood in stool, constipation, diarrhea, nausea and vomiting.   Endocrine: Negative for cold intolerance and heat intolerance.   Genitourinary:  Negative for difficulty urinating.   Musculoskeletal:  Negative for arthralgias, gait problem and myalgias.   Skin:  Negative for rash.   Neurological:  Negative for weakness and headaches.   Hematological:  Negative for adenopathy. Does not bruise/bleed easily.   Psychiatric/Behavioral:  Negative for dysphoric mood and sleep disturbance. The patient is  not nervous/anxious.        Objective   /80 (BP Location: Right arm, Patient Position: Sitting)   Pulse 64   Temp 36.7 °C (98.1 °F) (Temporal)   Wt 101 kg (223 lb)   SpO2 99%   BMI 31.10 kg/m²     Physical Exam  Vitals and nursing note reviewed.   Constitutional:       Appearance: Normal appearance.   HENT:      Head: Normocephalic and atraumatic.      Nose: Nose normal.      Mouth/Throat:      Mouth: Mucous membranes are moist.   Eyes:      Pupils: Pupils are equal, round, and reactive to light.   Cardiovascular:      Rate and Rhythm: Normal rate and regular rhythm.      Pulses: Normal pulses.      Heart sounds: Normal heart sounds.   Pulmonary:      Effort: Pulmonary effort is normal.      Breath sounds: Normal breath sounds.   Musculoskeletal:         General: No swelling. Normal range of motion.      Cervical back: Normal range of motion and neck supple.      Right lower leg: Edema (mild) present.      Left lower leg: Edema present.        Legs:    Skin:     Capillary Refill: Capillary refill takes less than 2 seconds.   Neurological:      General: No focal deficit present.      Mental Status: He is alert. Mental status is at baseline.   Psychiatric:         Mood and Affect: Mood normal.         Behavior: Behavior normal.         Thought Content: Thought content normal.         Judgment: Judgment normal.         Assessment/Plan   1. Left leg swelling  hydroCHLOROthiazide (Microzide) 12.5 mg tablet      2. Mass of soft tissue of left lower extremity  hydroCHLOROthiazide (Microzide) 12.5 mg tablet      3. Cellulitis of left lower leg  hydroCHLOROthiazide (Microzide) 12.5 mg tablet        Discussed with patient it appears the cellulitis has resolved at this point he has known chronic venous insufficiency and states his left leg is always slightly more swollen than his right we discussed using low-dose hydrochlorothiazide to help minimize swelling  We also discussed doing CT or MRI of soft tissue lesion.   He would like to monitor and see if it resolves over the next month or so.  Declines recheck labs for leukocytosis which I think is reasonable given he is clinically improved  He will call with any new or worsening symptoms  Has planned fu in 3 months   Sacha Nguyễn, DO

## 2024-12-11 NOTE — PATIENT INSTRUCTIONS
Take the hydrochlorothiazide for the swelling once a day     Let me know if that swelling/lump in left leg does not get better

## 2025-02-10 ENCOUNTER — TELEPHONE (OUTPATIENT)
Dept: GASTROENTEROLOGY | Facility: CLINIC | Age: 55
End: 2025-02-10
Payer: COMMERCIAL

## 2025-02-10 DIAGNOSIS — Z12.11 ENCOUNTER FOR SCREENING COLONOSCOPY: Primary | ICD-10-CM

## 2025-02-10 NOTE — TELEPHONE ENCOUNTER
----- Message from Tracie SZYMANSKI sent at 2/10/2025  9:52 AM EST -----  Regarding: recall  Per chart recall, pt due for repeat colonoscopy  - can you place the order for the screening colonoscopy and send to [facility] .  Thank you.    #Chart reviewed for age, BMI and anticoagulants.

## 2025-03-06 DIAGNOSIS — E03.9 HYPOTHYROIDISM, UNSPECIFIED TYPE: ICD-10-CM

## 2025-03-06 NOTE — TELEPHONE ENCOUNTER
Patient's spouse, Evie, requesting refill on his Levothyroxine 137mcg to to [facility] until his appointment 6/4  Rx pended for approval

## 2025-03-07 RX ORDER — LEVOTHYROXINE SODIUM 137 UG/1
137 TABLET ORAL DAILY
Qty: 90 TABLET | Refills: 0 | Status: SHIPPED | OUTPATIENT
Start: 2025-03-07 | End: 2026-03-07

## 2025-03-26 ENCOUNTER — APPOINTMENT (OUTPATIENT)
Dept: PRIMARY CARE | Facility: CLINIC | Age: 55
End: 2025-03-26
Payer: COMMERCIAL

## 2025-04-02 ENCOUNTER — APPOINTMENT (OUTPATIENT)
Dept: PRIMARY CARE | Facility: CLINIC | Age: 55
End: 2025-04-02
Payer: COMMERCIAL

## 2025-04-03 ENCOUNTER — APPOINTMENT (OUTPATIENT)
Dept: PRIMARY CARE | Facility: CLINIC | Age: 55
End: 2025-04-03
Payer: COMMERCIAL

## 2025-04-30 ENCOUNTER — TELEPHONE (OUTPATIENT)
Dept: PRIMARY CARE | Facility: CLINIC | Age: 55
End: 2025-04-30
Payer: COMMERCIAL

## 2025-05-15 LAB
ALBUMIN SERPL-MCNC: 4.3 G/DL (ref 3.6–5.1)
ALP SERPL-CCNC: 53 U/L (ref 35–144)
ALT SERPL-CCNC: 23 U/L (ref 9–46)
ANION GAP SERPL CALCULATED.4IONS-SCNC: 7 MMOL/L (CALC) (ref 7–17)
AST SERPL-CCNC: 14 U/L (ref 10–35)
BASOPHILS # BLD AUTO: 19 CELLS/UL (ref 0–200)
BASOPHILS NFR BLD AUTO: 0.4 %
BILIRUB SERPL-MCNC: 0.4 MG/DL (ref 0.2–1.2)
BUN SERPL-MCNC: 18 MG/DL (ref 7–25)
CALCIUM SERPL-MCNC: 9.4 MG/DL (ref 8.6–10.3)
CHLORIDE SERPL-SCNC: 105 MMOL/L (ref 98–110)
CO2 SERPL-SCNC: 29 MMOL/L (ref 20–32)
CREAT SERPL-MCNC: 0.88 MG/DL (ref 0.7–1.3)
EGFRCR SERPLBLD CKD-EPI 2021: 103 ML/MIN/1.73M2
EOSINOPHIL # BLD AUTO: 230 CELLS/UL (ref 15–500)
EOSINOPHIL NFR BLD AUTO: 4.8 %
ERYTHROCYTE [DISTWIDTH] IN BLOOD BY AUTOMATED COUNT: 14 % (ref 11–15)
EST. AVERAGE GLUCOSE BLD GHB EST-MCNC: 134 MG/DL
EST. AVERAGE GLUCOSE BLD GHB EST-SCNC: 7.4 MMOL/L
GLUCOSE SERPL-MCNC: 107 MG/DL (ref 65–99)
HBA1C MFR BLD: 6.3 %
HCT VFR BLD AUTO: 42.7 % (ref 38.5–50)
HGB BLD-MCNC: 13.9 G/DL (ref 13.2–17.1)
LYMPHOCYTES # BLD AUTO: 1445 CELLS/UL (ref 850–3900)
LYMPHOCYTES NFR BLD AUTO: 30.1 %
MCH RBC QN AUTO: 31.4 PG (ref 27–33)
MCHC RBC AUTO-ENTMCNC: 32.6 G/DL (ref 32–36)
MCV RBC AUTO: 96.6 FL (ref 80–100)
MONOCYTES # BLD AUTO: 298 CELLS/UL (ref 200–950)
MONOCYTES NFR BLD AUTO: 6.2 %
NEUTROPHILS # BLD AUTO: 2808 CELLS/UL (ref 1500–7800)
NEUTROPHILS NFR BLD AUTO: 58.5 %
PLATELET # BLD AUTO: 291 THOUSAND/UL (ref 140–400)
PMV BLD REES-ECKER: 11.2 FL (ref 7.5–12.5)
POTASSIUM SERPL-SCNC: 4.3 MMOL/L (ref 3.5–5.3)
PROT SERPL-MCNC: 6.7 G/DL (ref 6.1–8.1)
PSA SERPL-MCNC: 1.97 NG/ML
RBC # BLD AUTO: 4.42 MILLION/UL (ref 4.2–5.8)
SODIUM SERPL-SCNC: 141 MMOL/L (ref 135–146)
TESTOST FREE SERPL-MCNC: NORMAL PG/ML
TESTOST SERPL-MCNC: NORMAL NG/DL
WBC # BLD AUTO: 4.8 THOUSAND/UL (ref 3.8–10.8)

## 2025-05-19 LAB
ALBUMIN SERPL-MCNC: 4.3 G/DL (ref 3.6–5.1)
ALP SERPL-CCNC: 53 U/L (ref 35–144)
ALT SERPL-CCNC: 23 U/L (ref 9–46)
ANION GAP SERPL CALCULATED.4IONS-SCNC: 7 MMOL/L (CALC) (ref 7–17)
AST SERPL-CCNC: 14 U/L (ref 10–35)
BASOPHILS # BLD AUTO: 19 CELLS/UL (ref 0–200)
BASOPHILS NFR BLD AUTO: 0.4 %
BILIRUB SERPL-MCNC: 0.4 MG/DL (ref 0.2–1.2)
BUN SERPL-MCNC: 18 MG/DL (ref 7–25)
CALCIUM SERPL-MCNC: 9.4 MG/DL (ref 8.6–10.3)
CHLORIDE SERPL-SCNC: 105 MMOL/L (ref 98–110)
CO2 SERPL-SCNC: 29 MMOL/L (ref 20–32)
CREAT SERPL-MCNC: 0.88 MG/DL (ref 0.7–1.3)
EGFRCR SERPLBLD CKD-EPI 2021: 103 ML/MIN/1.73M2
EOSINOPHIL # BLD AUTO: 230 CELLS/UL (ref 15–500)
EOSINOPHIL NFR BLD AUTO: 4.8 %
ERYTHROCYTE [DISTWIDTH] IN BLOOD BY AUTOMATED COUNT: 14 % (ref 11–15)
EST. AVERAGE GLUCOSE BLD GHB EST-MCNC: 134 MG/DL
EST. AVERAGE GLUCOSE BLD GHB EST-SCNC: 7.4 MMOL/L
GLUCOSE SERPL-MCNC: 107 MG/DL (ref 65–99)
HBA1C MFR BLD: 6.3 %
HCT VFR BLD AUTO: 42.7 % (ref 38.5–50)
HGB BLD-MCNC: 13.9 G/DL (ref 13.2–17.1)
LYMPHOCYTES # BLD AUTO: 1445 CELLS/UL (ref 850–3900)
LYMPHOCYTES NFR BLD AUTO: 30.1 %
MCH RBC QN AUTO: 31.4 PG (ref 27–33)
MCHC RBC AUTO-ENTMCNC: 32.6 G/DL (ref 32–36)
MCV RBC AUTO: 96.6 FL (ref 80–100)
MONOCYTES # BLD AUTO: 298 CELLS/UL (ref 200–950)
MONOCYTES NFR BLD AUTO: 6.2 %
NEUTROPHILS # BLD AUTO: 2808 CELLS/UL (ref 1500–7800)
NEUTROPHILS NFR BLD AUTO: 58.5 %
PLATELET # BLD AUTO: 291 THOUSAND/UL (ref 140–400)
PMV BLD REES-ECKER: 11.2 FL (ref 7.5–12.5)
POTASSIUM SERPL-SCNC: 4.3 MMOL/L (ref 3.5–5.3)
PROT SERPL-MCNC: 6.7 G/DL (ref 6.1–8.1)
RBC # BLD AUTO: 4.42 MILLION/UL (ref 4.2–5.8)
SODIUM SERPL-SCNC: 141 MMOL/L (ref 135–146)
TESTOST FREE SERPL-MCNC: 43.9 PG/ML (ref 35–155)
TESTOST SERPL-MCNC: 477 NG/DL (ref 250–1100)
WBC # BLD AUTO: 4.8 THOUSAND/UL (ref 3.8–10.8)

## 2025-05-21 ENCOUNTER — APPOINTMENT (OUTPATIENT)
Dept: PRIMARY CARE | Facility: CLINIC | Age: 55
End: 2025-05-21
Payer: COMMERCIAL

## 2025-05-21 VITALS
SYSTOLIC BLOOD PRESSURE: 120 MMHG | TEMPERATURE: 97.7 F | BODY MASS INDEX: 32.16 KG/M2 | WEIGHT: 230.6 LBS | DIASTOLIC BLOOD PRESSURE: 80 MMHG

## 2025-05-21 DIAGNOSIS — R73.9 ELEVATED BLOOD SUGAR: ICD-10-CM

## 2025-05-21 DIAGNOSIS — R97.20 INCREASED PROSTATE SPECIFIC ANTIGEN (PSA) VELOCITY: Primary | ICD-10-CM

## 2025-05-21 DIAGNOSIS — Z12.11 ENCOUNTER FOR SCREENING FOR MALIGNANT NEOPLASM OF COLON: ICD-10-CM

## 2025-05-21 DIAGNOSIS — E78.2 MIXED HYPERLIPIDEMIA: ICD-10-CM

## 2025-05-21 DIAGNOSIS — I87.2 VENOUS INSUFFICIENCY: ICD-10-CM

## 2025-05-21 DIAGNOSIS — E03.9 HYPOTHYROIDISM, UNSPECIFIED TYPE: ICD-10-CM

## 2025-05-21 DIAGNOSIS — R03.0 ELEVATED BLOOD PRESSURE READING: ICD-10-CM

## 2025-05-21 PROCEDURE — 99214 OFFICE O/P EST MOD 30 MIN: CPT | Performed by: FAMILY MEDICINE

## 2025-05-21 PROCEDURE — 1036F TOBACCO NON-USER: CPT | Performed by: FAMILY MEDICINE

## 2025-05-21 ASSESSMENT — ENCOUNTER SYMPTOMS
BLOOD IN STOOL: 0
VOMITING: 0
DIFFICULTY URINATING: 0
NERVOUS/ANXIOUS: 0
ACTIVITY CHANGE: 0
SLEEP DISTURBANCE: 0
CONSTIPATION: 0
BRUISES/BLEEDS EASILY: 0
BACK PAIN: 1
EYE PAIN: 0
SHORTNESS OF BREATH: 0
COUGH: 0
MYALGIAS: 0
PALPITATIONS: 0
SINUS PAIN: 0
ARTHRALGIAS: 0
DIARRHEA: 0
NAUSEA: 0
DYSPHORIC MOOD: 0
WEAKNESS: 0
FEVER: 0
APPETITE CHANGE: 0
ABDOMINAL PAIN: 0
ADENOPATHY: 0
HEADACHES: 0

## 2025-05-21 ASSESSMENT — PATIENT HEALTH QUESTIONNAIRE - PHQ9
2. FEELING DOWN, DEPRESSED OR HOPELESS: NOT AT ALL
1. LITTLE INTEREST OR PLEASURE IN DOING THINGS: NOT AT ALL
SUM OF ALL RESPONSES TO PHQ9 QUESTIONS 1 AND 2: 0

## 2025-05-21 NOTE — PROGRESS NOTES
Subjective   Patient ID: Dillon Manzano Jr. is a 54 y.o. male who presents for Follow-up.    HPI   Here for medication follow-up    Hypothyroidism-taking levothyroxine as directed  Seen by endocrinology 5/24   States he is taking every day as directed without any issues  No supplements except fish oil in last few months  He does not use tobacco  Rare alcohol  He is trying to eat healthier    Hyperlipidemia  Taking fish oil as directed  Cholesterol continues to lower as below  CT cardiac score done 4/24 that showed a score of 5    Prediabetes-continues to try to work on healthy diet and exercise    Increased PSA velocity  Patient is not symptomatic but has had slowly rising PSA over the last year.  Father did have prostate cancer  He has seen urology in the past but not for many years    He is also very bothered by swelling in his left leg  He states many years ago he was diagnosed with chronic venous insufficiency but states after episode of cellulitis November/December 2024 the swelling just continues to be more bothersome to him despite wearing compression stockings  He does not have significant pain but it bothers him nonetheless send he is wondering about seeing somebody for this    Review of Systems   Constitutional:  Negative for activity change, appetite change and fever.   HENT:  Negative for congestion, ear pain and sinus pain.    Eyes:  Negative for pain and visual disturbance.   Respiratory:  Negative for cough and shortness of breath.    Cardiovascular:  Positive for leg swelling. Negative for chest pain and palpitations.   Gastrointestinal:  Negative for abdominal pain, blood in stool, constipation, diarrhea, nausea and vomiting.   Endocrine: Negative for cold intolerance and heat intolerance.   Genitourinary:  Negative for difficulty urinating.   Musculoskeletal:  Positive for back pain. Negative for arthralgias, gait problem and myalgias.   Skin:  Negative for rash.   Neurological:  Negative for  weakness and headaches.   Hematological:  Negative for adenopathy. Does not bruise/bleed easily.   Psychiatric/Behavioral:  Negative for dysphoric mood and sleep disturbance. The patient is not nervous/anxious.        Objective   /80   Temp 36.5 °C (97.7 °F)   Wt 105 kg (230 lb 9.6 oz)   BMI 32.16 kg/m²     Physical Exam  Vitals and nursing note reviewed.   Constitutional:       Appearance: Normal appearance.   HENT:      Head: Normocephalic and atraumatic.      Right Ear: External ear normal.      Left Ear: External ear normal.      Nose: Nose normal.      Mouth/Throat:      Mouth: Mucous membranes are moist.   Eyes:      Pupils: Pupils are equal, round, and reactive to light.   Cardiovascular:      Rate and Rhythm: Normal rate and regular rhythm.      Pulses: Normal pulses.      Heart sounds: Normal heart sounds. No murmur heard.     No friction rub. No gallop.   Pulmonary:      Effort: Pulmonary effort is normal.      Breath sounds: Normal breath sounds.   Musculoskeletal:         General: No swelling. Normal range of motion.      Cervical back: Normal range of motion and neck supple.      Right lower leg: No edema.      Left lower leg: No edema.      Comments: Difficult to assess edema in lower extremities as bilateral compression stockings to the knees   Lymphadenopathy:      Cervical: No cervical adenopathy.   Skin:     Capillary Refill: Capillary refill takes less than 2 seconds.   Neurological:      General: No focal deficit present.      Mental Status: He is alert. Mental status is at baseline.   Psychiatric:         Mood and Affect: Mood normal.         Behavior: Behavior normal.         Thought Content: Thought content normal.         Judgment: Judgment normal.       Lab Results   Component Value Date    TSH 4.59 (H) 09/23/2024     Lab Results   Component Value Date    HGBA1C 6.3 (H) 05/14/2025     Lab Results   Component Value Date    CHOL 209 (H) 09/23/2024    CHOL 217 (H) 01/26/2024    CHOL  "259 (H) 07/21/2023     Lab Results   Component Value Date    HDL 42.4 09/23/2024    HDL 44.4 01/26/2024    HDL 45.3 07/21/2023     Lab Results   Component Value Date    LDLCALC 148 (H) 09/23/2024    LDLCALC 146 (H) 01/26/2024     Lab Results   Component Value Date    TRIG 92 09/23/2024    TRIG 135 01/26/2024    TRIG 103 07/21/2023     No components found for: \"CHOLHDL\"     Assessment/Plan   1. Hypothyroidism, unspecified type  Labs due.  Follow-up to be determined    2.  Increased PSA velocity and family history of prostate cancer  Given continued elevation and family history referral to urology made today    3. Elevated blood sugar + 4. Mixed hyperlipidemia  Slightly worsened on both accounts  Discussed diet and exercise which patient wishes to work on.  Gave information about making more protein choices rather than simple carbs  Will plan to follow-up with 4 to 6 months unless concerns sooner    5.  Chronic venous insufficiency  Patient would like to see somebody to talk about other next steps as diuretics did not help much and it bothers him despite compression stockings    6.  Tubular adenomas  Due for colonoscopy.  Currently asymptomatic.  Referral placed          Sacha Nguyễn, DO  "

## 2025-06-04 ENCOUNTER — TELEPHONE (OUTPATIENT)
Facility: CLINIC | Age: 55
End: 2025-06-04

## 2025-06-04 ENCOUNTER — APPOINTMENT (OUTPATIENT)
Dept: PRIMARY CARE | Facility: CLINIC | Age: 55
End: 2025-06-04
Payer: COMMERCIAL

## 2025-06-04 NOTE — TELEPHONE ENCOUNTER
[facility] OPEN ACCESS COLONOSCOPY SCREENING FORM       Last Colonoscopy? Where: [place]  When: 2022  ANESTHESIA SCREENING   1. Height 5'11     Weight 230  BMI 32.16    (Clinic Visit if BMI over 42)   2. Are you on any blood thinning medications including  mg? NO  ( Clinic visit if yes)  Name of blood thinning medication: n/a  3. Are you on oxygen at home? NO (Clinic visit if yes)   4. Have you seen your primary care provider within the last 12 months? YES (Clinic visit if NO)   5. History of:   Pacemaker/Defibrillator NO                          Heart Failure NO                         Heart Disease NO                          Stroke NO   Details of cardiac history: NO  (Clinic visit if history of heart failure or new diagnosis/new intervention in last year)     6. Do you have renal failure or require dialysis? NO  7. Do you have sleep Apnea? NO  8. Are you on insulin for diabetes? NO If yes, patient should discuss delbert-procedural medication adjustment with PCP.   9. Are you currently on GLP-1 or SGLT2 inhibitors? NO  10. Do you have a history of seizures? NO (If YES- indicate recent or NOT recent. Anesthesia to review if recent.)  11.) Do you have a history of Drug/Alcohol Abuse? NO  (If YES- indicate how recent. If within the last year Patient needs to be seen in the office)    GI SCREENING   Have you had a positive stool based screening test? (i.e. fecal occult, FIT, or Cologuard) NO   ? If yes and pass anesthesia screen, no further questions are needed. Schedule [facility] procedure.   ? If yes and they do NOT pass anesthesia screen then refer to office for expedited review/visit.   ? If no, proceed to the following GI screening questions to determine if office visit is needed.      If patient answers YES to any of the following please send to the office for an appointment.  1. Do you have chronic diarrhea lasting longer than 3 weeks? NO   2. Do you have a large amount of rectal bleeding or frequent rectal  bleeding? NO  3. Do you have significant, unexplained weight loss? NO    Office visit required NO  Open Access APPROVED YES

## 2025-06-20 ENCOUNTER — PREP FOR PROCEDURE (OUTPATIENT)
Facility: CLINIC | Age: 55
End: 2025-06-20
Payer: COMMERCIAL

## 2025-06-20 RX ORDER — SODIUM CHLORIDE 9 MG/ML
20 INJECTION, SOLUTION INTRAVENOUS CONTINUOUS
Status: CANCELLED | OUTPATIENT
Start: 2025-06-20 | End: 2025-06-20

## 2025-06-23 ENCOUNTER — ANESTHESIA EVENT (OUTPATIENT)
Dept: GASTROENTEROLOGY | Facility: HOSPITAL | Age: 55
End: 2025-06-23
Payer: COMMERCIAL

## 2025-06-23 ENCOUNTER — HOSPITAL ENCOUNTER (OUTPATIENT)
Dept: GASTROENTEROLOGY | Facility: HOSPITAL | Age: 55
Discharge: HOME | End: 2025-06-23
Payer: COMMERCIAL

## 2025-06-23 ENCOUNTER — ANESTHESIA (OUTPATIENT)
Dept: GASTROENTEROLOGY | Facility: HOSPITAL | Age: 55
End: 2025-06-23
Payer: COMMERCIAL

## 2025-06-23 VITALS
HEART RATE: 70 BPM | OXYGEN SATURATION: 96 % | SYSTOLIC BLOOD PRESSURE: 121 MMHG | TEMPERATURE: 97.1 F | HEIGHT: 71 IN | DIASTOLIC BLOOD PRESSURE: 86 MMHG | BODY MASS INDEX: 32.2 KG/M2 | RESPIRATION RATE: 17 BRPM | WEIGHT: 230 LBS

## 2025-06-23 DIAGNOSIS — Z12.11 ENCOUNTER FOR SCREENING FOR MALIGNANT NEOPLASM OF COLON: ICD-10-CM

## 2025-06-23 PROCEDURE — 2500000004 HC RX 250 GENERAL PHARMACY W/ HCPCS (ALT 636 FOR OP/ED): Performed by: INTERNAL MEDICINE

## 2025-06-23 PROCEDURE — 45380 COLONOSCOPY AND BIOPSY: CPT | Performed by: INTERNAL MEDICINE

## 2025-06-23 PROCEDURE — 7100000010 HC PHASE TWO TIME - EACH INCREMENTAL 1 MINUTE

## 2025-06-23 PROCEDURE — 3700000001 HC GENERAL ANESTHESIA TIME - INITIAL BASE CHARGE

## 2025-06-23 PROCEDURE — 7100000009 HC PHASE TWO TIME - INITIAL BASE CHARGE

## 2025-06-23 PROCEDURE — 2500000004 HC RX 250 GENERAL PHARMACY W/ HCPCS (ALT 636 FOR OP/ED): Performed by: NURSE ANESTHETIST, CERTIFIED REGISTERED

## 2025-06-23 PROCEDURE — 3700000002 HC GENERAL ANESTHESIA TIME - EACH INCREMENTAL 1 MINUTE

## 2025-06-23 RX ORDER — SODIUM CHLORIDE 9 MG/ML
20 INJECTION, SOLUTION INTRAVENOUS CONTINUOUS
Status: ACTIVE | OUTPATIENT
Start: 2025-06-23 | End: 2025-06-23

## 2025-06-23 RX ORDER — LIDOCAINE HCL/PF 100 MG/5ML
SYRINGE (ML) INTRAVENOUS AS NEEDED
Status: DISCONTINUED | OUTPATIENT
Start: 2025-06-23 | End: 2025-06-23

## 2025-06-23 RX ORDER — PROPOFOL 10 MG/ML
INJECTION, EMULSION INTRAVENOUS AS NEEDED
Status: DISCONTINUED | OUTPATIENT
Start: 2025-06-23 | End: 2025-06-23

## 2025-06-23 RX ADMIN — SODIUM CHLORIDE 20 ML/HR: 0.9 INJECTION, SOLUTION INTRAVENOUS at 09:20

## 2025-06-23 RX ADMIN — LIDOCAINE HYDROCHLORIDE 100 MG: 20 INJECTION, SOLUTION INTRAVENOUS at 09:58

## 2025-06-23 RX ADMIN — PROPOFOL 260 MG: 10 INJECTION, EMULSION INTRAVENOUS at 09:58

## 2025-06-23 SDOH — HEALTH STABILITY: MENTAL HEALTH: CURRENT SMOKER: 0

## 2025-06-23 ASSESSMENT — PAIN SCALES - GENERAL
PAINLEVEL_OUTOF10: 0 - NO PAIN

## 2025-06-23 ASSESSMENT — PAIN - FUNCTIONAL ASSESSMENT
PAIN_FUNCTIONAL_ASSESSMENT: 0-10

## 2025-06-23 ASSESSMENT — COLUMBIA-SUICIDE SEVERITY RATING SCALE - C-SSRS
6. HAVE YOU EVER DONE ANYTHING, STARTED TO DO ANYTHING, OR PREPARED TO DO ANYTHING TO END YOUR LIFE?: NO
2. HAVE YOU ACTUALLY HAD ANY THOUGHTS OF KILLING YOURSELF?: NO
1. IN THE PAST MONTH, HAVE YOU WISHED YOU WERE DEAD OR WISHED YOU COULD GO TO SLEEP AND NOT WAKE UP?: NO

## 2025-06-23 NOTE — LETTER
"June 27, 2025     Dillon Manzano JrFariba  [address] [id number]      Dear Mr. Manzano:    Below are the results from your recent visit:    The polyp (or polyps) that I removed during your recent colonoscopy were hyperplastic polyps. These polyps are benign (not cancerous) and they are not considered \"pre-cancerous\" either. They do not  appear to increase the risk of colon cancer. I recommend that you have another colonoscopy in about 5 years.     If you have any other questions or concerns please do not hesitate to call me at my office at [phone].     Repeat Colonoscopy Interval: 5 years        If you have any questions or concerns, please don't hesitate to call.         Sincerely,        Manuelito Turner MD                  Resulted Orders   Surgical Pathology Exam   Result Value Ref Range    Case Report       Surgical Pathology                                Case: [id number]                                  Authorizing Provider:  Manuelito Turner MD        Collected:           06/23/2025 1011              Ordering Location:     [facility]    Received:            06/23/2025 1111                                     Building                                                                     Pathologist:           Nilam Roy MD PhD                                                        Specimen:    RECTUM POLYPECTOMY                                                                         FINAL DIAGNOSIS       A. RECTUM, POLYP, POLYPECTOMY:  HYPERPLASTIC POLYP.                  By the signature on this report, the individual or group listed as making the Final Interpretation/Diagnosis certifies that they have reviewed this case.       Clinical History       Z12.11 - Encounter for screening for malignant neoplasm of colon [ICD-10-CM]           Gross Description       A: Received in formalin, labeled with the patient's name and hospital number and \"1, rectum polyp\", is a " fragment of tan, soft tissue measuring 0.3 x 0.3 x 0.2 cm. The specimen is submitted in toto in one cassette.  SSD

## 2025-06-23 NOTE — ANESTHESIA PREPROCEDURE EVALUATION
Patient: Dillon Manzano Jr.    Procedure Information       Date/Time: 06/23/25 1000    Scheduled providers: Manuelito Turner MD    Procedure: COLONOSCOPY    [facility]: [facility]            Relevant Problems   Anesthesia (within normal limits)      Cardiac   (+) Mixed hyperlipidemia      GI   (+) Acid reflux      Endocrine   (+) Class 1 obesity with body mass index (BMI) of 31.0 to 31.9 in adult   (+) Hypothyroidism      Hematology   (+) Anemia       Clinical information reviewed:   Tobacco  Allergies  Meds   Med Hx  Surg Hx   Fam Hx  Soc Hx        NPO Detail:  NPO/Void Status  Carbohydrate Drink Given Prior to Surgery? : N  Date of Last Liquid: 06/23/25  Time of Last Liquid: 0630  Date of Last Solid: 06/21/25  Time of Last Solid: 2000  Last Intake Type: Clear fluids  Time of Last Void: 0921         Physical Exam    Airway  Mallampati: II     Cardiovascular - normal exam   Dental    Pulmonary - normal exam   Abdominal      Other findings: Beard mustache        Anesthesia Plan    History of general anesthesia?: yes  History of complications of general anesthesia?: no    ASA 2     MAC     The patient is not a current smoker.    Anesthetic plan and risks discussed with patient.  Use of blood products discussed with who consented to blood products.

## 2025-06-23 NOTE — ANESTHESIA POSTPROCEDURE EVALUATION
Patient: Dillon Manzano Jr.    Procedure Summary       Date: 06/23/25 Room / Location: [facility]    Anesthesia Start: 0949 Anesthesia Stop: 1021    Procedure: COLONOSCOPY Diagnosis: Encounter for screening for malignant neoplasm of colon    Scheduled Providers: Manuelito Turner MD Responsible Provider: MARIELA Torres    Anesthesia Type: MAC ASA Status: 2            Anesthesia Type: MAC    Vitals Value Taken Time   /86 06/23/25 10:37   Temp 36.2 °C (97.1 °F) 06/23/25 10:37   Pulse 70 06/23/25 10:37   Resp 17 06/23/25 10:37   SpO2 96 % 06/23/25 10:37       Anesthesia Post Evaluation    Patient location during evaluation: bedside  Patient participation: complete - patient participated  Level of consciousness: awake  Pain management: adequate  Airway patency: patent  Cardiovascular status: acceptable  Respiratory status: acceptable  Hydration status: acceptable  Postoperative Nausea and Vomiting: none        There were no known notable events for this encounter.

## 2025-06-23 NOTE — H&P
Pre-sedation Evaluation:  Sedation Necessary For: Analgesia  Sedation to be Managed By: Anesthesia (Monitored Anesthesia Care/MAC)    History of Present Illness and Indication for Procedure      Dillon Manzano Jr. is a 54 y.o. male with a history of hypothyroidism, HLD, obesity, and GERD who presents for OPEN ACCESS colonoscopy requested by his PCP for the purposes of screening/surveillance.    He previously had a colonoscopy in 2022 that showed a cecal polyp (TA on path), ascending polyp (TA on path), a 12 mm rectal polyp (TA on path), diverticulosis, and hemorrhoids.    There is no family history of colorectal cancer.      NPO guidelines met: Yes         Review of Systems  Constitutional:  Negative for chills, fever and unexpected weight change.   HENT:  Negative for trouble swallowing.    Respiratory:  Negative for shortness of breath.    Cardiovascular:  Negative for chest pain.   Gastrointestinal:  As above.   Skin:  Negative for color change.       I performed a complete 10 point review of systems and it is negative except as noted in HPI or above. All other systems have been reviewed and are negative.      Problem List[1]    Past Medical History:  He has no past medical history on file.    Past Surgical History:  He has a past surgical history that includes Other surgical history (05/01/2019); Other surgical history (05/01/2019); and Other surgical history (05/01/2019).      Social History:  He reports that he has never smoked. He has been exposed to tobacco smoke. He has never used smokeless tobacco. He reports current alcohol use. He reports that he does not use drugs.    Family History:  Family History[2]     Allergies:  Patient has no known allergies.    Current Medications  Medications Ordered Prior to Encounter[3]      Last Recorded Vitals  Blood pressure 135/85, pulse 75, temperature 36.8 °C (98.2 °F), temperature source Temporal, resp. rate 14, SpO2 98%.      Physical Exam  Vitals reviewed.    Constitutional:       General: He is not in acute distress.     Appearance: He is not ill-appearing.   HENT:      Head: Normocephalic and atraumatic.      Mouth/Throat:      Comments: Mallampati: II  Cardiovascular:      Rate and Rhythm: Normal rate and regular rhythm.      Pulses: Normal pulses.      Heart sounds: Normal heart sounds. No murmur heard.  Pulmonary:      Effort: Pulmonary effort is normal. No respiratory distress.   Abdominal:      General: Bowel sounds are normal.      Palpations: Abdomen is soft.      Tenderness: There is no abdominal tenderness.   Skin:     General: Skin is warm and dry.   Neurological:      General: No focal deficit present.      Mental Status: He is alert and oriented to person, place, and time.              Assessment/Plan     Colonoscopy in endo with MAC sedation, ASA 2        Level of Sedation: Moderate Sedation  (Sedation medications to be delivered via monitored anesthesia care (MAC).     This evaluation serves as my H&P.     Outpatient medication list and allergies have been reviewed.  Pre-procedure/delbert procedure antibiotics not needed.     Pre-procedure evaluation completed by physician.           Manuelito Turner MD               [1]   Patient Active Problem List  Diagnosis    Acid reflux    Anemia    Elevated blood sugar    Hydrocele    Hypothyroidism    Irritation of left eye    Left low back pain    Palpitations    Snoring    Testicle lump    Class 1 obesity with body mass index (BMI) of 31.0 to 31.9 in adult    Mixed hyperlipidemia   [2]   Family History  Problem Relation Name Age of Onset    Diabetes Mother      Hypertension Mother      Lung cancer Mother      Lung cancer Father      Prostate cancer Father     [3]   Current Outpatient Medications on File Prior to Encounter   Medication Sig Dispense Refill    HYDROcodone-acetaminophen (Norco)  mg tablet TAKE 1 TABLET BY MOUTH 1-5 TIMES DAILY AS NEEDED      levothyroxine (Synthroid, Levoxyl) 137 mcg tablet  Take 1 tablet (137 mcg) by mouth once daily. as directed 90 tablet 0    meloxicam (Mobic) 15 mg tablet       methocarbamol (Robaxin) 750 mg tablet TAKE 1 TABLET BY MOUTH 1 TO 3 TIMES DAILY      hydroCHLOROthiazide (Microzide) 12.5 mg tablet Take 1 tablet (12.5 mg) by mouth once daily. (Patient not taking: Reported on 5/21/2025) 30 tablet 0     No current facility-administered medications on file prior to encounter.

## 2025-06-24 NOTE — ADDENDUM NOTE
Encounter addended by: Og Sebastian RN on: 6/24/2025 10:49 AM   Actions taken: Contacts section saved, Flowsheet accepted

## 2025-06-26 DIAGNOSIS — E03.9 HYPOTHYROIDISM, UNSPECIFIED TYPE: ICD-10-CM

## 2025-06-26 RX ORDER — LEVOTHYROXINE SODIUM 137 UG/1
137 TABLET ORAL DAILY
Qty: 90 TABLET | Refills: 0 | Status: SHIPPED | OUTPATIENT
Start: 2025-06-26 | End: 2026-06-26

## 2025-06-26 NOTE — TELEPHONE ENCOUNTER
Evie, patient's wife, LVM, requesting refill of Dillon's Levothyroxine 137 mcg to be sent to [facility] in [place].    LOV - 5/21/25  NOV - 9/15/25

## 2025-06-27 ENCOUNTER — TELEPHONE (OUTPATIENT)
Facility: CLINIC | Age: 55
End: 2025-06-27
Payer: COMMERCIAL

## 2025-06-27 LAB
LABORATORY COMMENT REPORT: NORMAL
PATH REPORT.FINAL DX SPEC: NORMAL
PATH REPORT.GROSS SPEC: NORMAL
PATH REPORT.RELEVANT HX SPEC: NORMAL
PATH REPORT.TOTAL CANCER: NORMAL

## 2025-06-27 NOTE — TELEPHONE ENCOUNTER
----- Message from Manuelito Turner sent at 6/27/2025  2:48 PM EDT -----  Hyperplastic, repeat colonoscopy in 5 years.    Office staff: please print result and mail to patient; [facility] is not active.    ----- Message -----  From: Lab, Background User  Sent: 6/27/2025   2:33 PM EDT  To: Manuelito Turner MD

## 2025-07-03 ENCOUNTER — APPOINTMENT (OUTPATIENT)
Dept: VASCULAR SURGERY | Facility: HOSPITAL | Age: 55
End: 2025-07-03
Payer: COMMERCIAL

## 2025-07-15 ENCOUNTER — APPOINTMENT (OUTPATIENT)
Dept: VASCULAR SURGERY | Facility: HOSPITAL | Age: 55
End: 2025-07-15
Payer: COMMERCIAL

## 2025-08-27 ENCOUNTER — TELEPHONE (OUTPATIENT)
Dept: VASCULAR SURGERY | Facility: HOSPITAL | Age: 55
End: 2025-08-27
Payer: COMMERCIAL

## 2025-09-02 ENCOUNTER — APPOINTMENT (OUTPATIENT)
Dept: VASCULAR SURGERY | Facility: HOSPITAL | Age: 55
End: 2025-09-02
Payer: COMMERCIAL

## 2025-09-03 ENCOUNTER — OFFICE VISIT (OUTPATIENT)
Dept: VASCULAR SURGERY | Facility: HOSPITAL | Age: 55
End: 2025-09-03
Payer: COMMERCIAL

## 2025-09-03 VITALS
DIASTOLIC BLOOD PRESSURE: 87 MMHG | BODY MASS INDEX: 30.96 KG/M2 | HEART RATE: 76 BPM | SYSTOLIC BLOOD PRESSURE: 134 MMHG | WEIGHT: 222 LBS

## 2025-09-03 DIAGNOSIS — I83.893 VARICOSE VEINS OF BOTH LEGS WITH EDEMA: ICD-10-CM

## 2025-09-03 DIAGNOSIS — R60.0 LOWER EXTREMITY EDEMA: Primary | ICD-10-CM

## 2025-09-03 DIAGNOSIS — I87.2 VENOUS INSUFFICIENCY: ICD-10-CM

## 2025-09-03 PROCEDURE — 1036F TOBACCO NON-USER: CPT | Performed by: PHYSICIAN ASSISTANT

## 2025-09-03 PROCEDURE — 99214 OFFICE O/P EST MOD 30 MIN: CPT | Performed by: PHYSICIAN ASSISTANT

## 2025-09-03 PROCEDURE — 99204 OFFICE O/P NEW MOD 45 MIN: CPT | Performed by: PHYSICIAN ASSISTANT

## 2025-09-03 ASSESSMENT — ENCOUNTER SYMPTOMS
FACIAL ASYMMETRY: 0
COUGH: 0
CONSTIPATION: 0
FATIGUE: 0
ABDOMINAL PAIN: 0
DIFFICULTY URINATING: 0
ARTHRALGIAS: 0
JOINT SWELLING: 0
COLOR CHANGE: 0
SPEECH DIFFICULTY: 0
WHEEZING: 0
ADENOPATHY: 0
VOMITING: 0
DIARRHEA: 0
TROUBLE SWALLOWING: 0
PALPITATIONS: 0
CONFUSION: 0
SEIZURES: 0
NAUSEA: 0
SHORTNESS OF BREATH: 0
WEAKNESS: 0
NECK PAIN: 0
DIZZINESS: 0
SLEEP DISTURBANCE: 0
WOUND: 0
FEVER: 0
NUMBNESS: 0
HEADACHES: 0
CHILLS: 0
SORE THROAT: 0
LIGHT-HEADEDNESS: 0

## 2025-09-15 ENCOUNTER — APPOINTMENT (OUTPATIENT)
Dept: PRIMARY CARE | Facility: CLINIC | Age: 55
End: 2025-09-15
Payer: COMMERCIAL